# Patient Record
Sex: FEMALE | Race: WHITE | NOT HISPANIC OR LATINO | Employment: UNEMPLOYED
[De-identification: names, ages, dates, MRNs, and addresses within clinical notes are randomized per-mention and may not be internally consistent; named-entity substitution may affect disease eponyms.]

---

## 2017-01-04 ENCOUNTER — RECORDS - HEALTHEAST (OUTPATIENT)
Dept: ADMINISTRATIVE | Facility: OTHER | Age: 47
End: 2017-01-04

## 2017-01-09 ENCOUNTER — AMBULATORY - HEALTHEAST (OUTPATIENT)
Dept: SURGERY | Facility: CLINIC | Age: 47
End: 2017-01-09

## 2017-01-09 DIAGNOSIS — N63.10 BREAST MASS, RIGHT: ICD-10-CM

## 2017-01-12 ENCOUNTER — OFFICE VISIT - HEALTHEAST (OUTPATIENT)
Dept: SURGERY | Facility: CLINIC | Age: 47
End: 2017-01-12

## 2017-01-12 DIAGNOSIS — Z71.1 WORRIED WELL: ICD-10-CM

## 2017-01-12 ASSESSMENT — MIFFLIN-ST. JEOR: SCORE: 1200.34

## 2017-02-15 ENCOUNTER — TRANSFERRED RECORDS (OUTPATIENT)
Dept: HEALTH INFORMATION MANAGEMENT | Facility: CLINIC | Age: 47
End: 2017-02-15

## 2017-03-30 ENCOUNTER — AMBULATORY - HEALTHEAST (OUTPATIENT)
Dept: CARDIOLOGY | Facility: CLINIC | Age: 47
End: 2017-03-30

## 2017-03-31 ENCOUNTER — AMBULATORY - HEALTHEAST (OUTPATIENT)
Dept: CARDIOLOGY | Facility: CLINIC | Age: 47
End: 2017-03-31

## 2017-04-03 ENCOUNTER — AMBULATORY - HEALTHEAST (OUTPATIENT)
Dept: CARDIOLOGY | Facility: CLINIC | Age: 47
End: 2017-04-03

## 2017-07-31 ENCOUNTER — RECORDS - HEALTHEAST (OUTPATIENT)
Dept: LAB | Facility: HOSPITAL | Age: 47
End: 2017-07-31

## 2017-07-31 LAB — HBV SURFACE AB SERPL IA-ACNC: NEGATIVE M[IU]/ML

## 2018-06-08 ENCOUNTER — TRANSFERRED RECORDS (OUTPATIENT)
Dept: HEALTH INFORMATION MANAGEMENT | Facility: CLINIC | Age: 48
End: 2018-06-08

## 2018-06-08 ENCOUNTER — RECORDS - HEALTHEAST (OUTPATIENT)
Dept: LAB | Facility: CLINIC | Age: 48
End: 2018-06-08

## 2018-06-08 LAB
25(OH)D3 SERPL-MCNC: 40.9 NG/ML (ref 30–80)
CHOLEST SERPL-MCNC: 274 MG/DL
CHOLEST SERPL-MCNC: 274 MG/DL
FASTING STATUS PATIENT QL REPORTED: ABNORMAL
HDLC SERPL-MCNC: 61 MG/DL
HDLC SERPL-MCNC: 61 MG/DL
LDLC SERPL CALC-MCNC: 197 MG/DL
LDLC SERPL CALC-MCNC: 197 MG/DL
NONHDLC SERPL-MCNC: NORMAL MG/DL
TRIGL SERPL-MCNC: 79 MG/DL
TRIGL SERPL-MCNC: 79 MG/DL
TSH SERPL DL<=0.005 MIU/L-ACNC: 10.75 UIU/ML (ref 0.3–5)
TSH SERPL-ACNC: 10.75 MCU/ML (ref 0.3–5)

## 2018-06-25 ENCOUNTER — TELEPHONE (OUTPATIENT)
Dept: CARDIOLOGY | Facility: CLINIC | Age: 48
End: 2018-06-25

## 2018-06-25 NOTE — TELEPHONE ENCOUNTER
Patient stopped in requesting an appointment with Dr. Norman for feeling light headed and Blood pressure has been high and low.  Last OV with Dr. Norman was 11- Plan was to follow up on an as needed basis.  Next available is in September. Scheduling asked if a Team 3 nurse would evaluate when patient needed to be seen ? Sooner.? She has seen her PMD recently for thyroid and bloodwork and recommend she see them about recent sx of light headed and BP fluctuations.  If PMD feels patient needs to be seen by Cardiology soon will make arrangements to see a DOD or another provider.  Patient verbalizes understanding and will keep an OV in August with Dr. Norman and will cancel if not needed.

## 2018-06-28 ENCOUNTER — TRANSFERRED RECORDS (OUTPATIENT)
Dept: HEALTH INFORMATION MANAGEMENT | Facility: CLINIC | Age: 48
End: 2018-06-28

## 2018-07-20 ENCOUNTER — DOCUMENTATION ONLY (OUTPATIENT)
Dept: CARDIOLOGY | Facility: CLINIC | Age: 48
End: 2018-07-20

## 2018-08-09 ENCOUNTER — OFFICE VISIT (OUTPATIENT)
Dept: CARDIOLOGY | Facility: CLINIC | Age: 48
End: 2018-08-09
Payer: COMMERCIAL

## 2018-08-09 VITALS
SYSTOLIC BLOOD PRESSURE: 126 MMHG | DIASTOLIC BLOOD PRESSURE: 78 MMHG | HEART RATE: 78 BPM | HEIGHT: 66 IN | WEIGHT: 120.8 LBS | BODY MASS INDEX: 19.41 KG/M2

## 2018-08-09 DIAGNOSIS — R00.2 PALPITATIONS: Primary | ICD-10-CM

## 2018-08-09 PROCEDURE — 93000 ELECTROCARDIOGRAM COMPLETE: CPT | Performed by: INTERNAL MEDICINE

## 2018-08-09 PROCEDURE — 99214 OFFICE O/P EST MOD 30 MIN: CPT | Performed by: INTERNAL MEDICINE

## 2018-08-09 RX ORDER — ALPRAZOLAM 1 MG
1 TABLET ORAL 3 TIMES DAILY PRN
COMMUNITY

## 2018-08-09 NOTE — LETTER
8/9/2018      Connor Valentine  [facility] [address]  [facility] [address]      RE: Carla Bryan       Dear Colleague,    I had the pleasure of seeing Carla Bryan in the [facility].    Service Date: 08/09/2018      REASON FOR CLINIC VISIT:  Palpitation, fluctuating blood pressure.      HISTORY OF PRESENT ILLNESS:  Ms. Bryan (previously Ms. Islas***) is a very pleasant 47-year-old female who I initially saw in 06/2015 when she was admitted at [facility] because of chest discomfort in the setting of uncontrolled pain due to fibromyalgia.  She had mild troponin elevation, and echocardiogram showed moderate to severely reduced LV systolic function with LVEF of 30%-35% with regional wall motion abnormalities mainly in one of the midsegments, with the apex and very base chandrika okay.  This was suspicious for stress cardiomyopathy.  She underwent coronary angiogram that showed normal coronary arteries.  She was started on low-dose beta blocker, ACE inhibitor, and subsequent echocardiogram showed normalization LV function and resolution of wall motion abnormalities.  Subsequently, these medications were discontinued due to low blood pressure.  She has other comorbidities of family history of premature coronary artery disease with father having bypass surgery in mid 50s and personal history of tobacco abuse.  The patient was last time seen in 11/2016.  She was doing very well from a cardiac standpoint of view.  Today, the patient is coming because of concerns about palpitation and fluctuating blood pressure.  The patient tells me that she has noticed that her blood pressure has been fluctuating.  At times it can go up to systolic 140s-150s, at times close to 100 systolic.  She has also noticed palpitation which she describes as fluttering sensation.  This can happen several times a week.  The longest she has gone without palpitation symptoms  are about a week.  This week has been very stressful for her as there were 2 deaths in the family, a friend and father of her kid passed away.  But her symptoms started before this week for last several weeks.  She has also noticed some fatigue.  Remarkably, she is fairly active.  She walks quite a bit.  She does biking and she does rollerskating.  She has slightly decreased the intensity of these physical activities but has not noticed any particular symptoms with exertion.  No presyncope or syncope.  Unfortunately, she continues to smoke 3-5 cigarettes per day.      PHYSICAL EXAMINATION:   VITAL SIGNS:  Blood pressure 126/78, heart rate 78 regular, weight 120 pounds, BMI 19.54.   GENERAL:  The patient appears pleasant, comfortable.   NECK:  Normal JVP, no bruit.   CARDIOVASCULAR SYSTEM:  S1, S2 normal, no murmur, rub or gallop.   RESPIRATORY SYSTEM:  Clear to auscultation bilaterally.   GASTROINTESTINAL SYSTEM:  Abdomen soft, nontender.   EXTREMITIES:  No pitting pedal edema.   NEUROLOGICAL:  Alert, oriented x3.   PSYCHIATRIC:  Normal affect.   SKIN:  No obvious rash.   HEENT:  No pallor or icterus.      EKG was personally reviewed by me and shows sinus rhythm with normal intervals.      IMPRESSION AND PLAN:  A pleasant 47-year-old female with history of stress cardiomyopathy that happened in the setting of uncontrolled fibromyalgia-related pain, with normal coronary arteries on coronary angiogram, with subsequent normalization of LV function.  She is now having symptoms of palpitation, fatigue and some fluctuating blood pressure.  Regarding the blood pressure, I think overall blood pressure seems to be in the normal range.  The blood pressure was normal in the clinic.  I had a long discussion with the patient that blood pressure is supposed to be dynamic in nature.  Regarding palpitations, these sound like fluttering sensation, may be skipped beat sensation or maybe a short run of arrhythmia.  I discussed option  of doing event monitor.  We initially discussed doing an event monitor for 2 weeks, but in order not to miss episode of sustained arrhythmia, we decided to do for 3 weeks' time.  I also recommend as an abundance of precaution echocardiogram to make sure there is no structural heart abnormality.  I strongly recommend to completely quit tobacco.  I am setting up a followup in about a month, and we will go over the results of the echocardiogram and the event monitor.         THAI LAYNE MD             D: 2018   T: 2018   MT: BRYANT      Name:     LEONARDO QUINONEZ   MRN:      [id number]        Account:      [id number]   :      1970           Service Date: 2018      Document: [id number]         Outpatient Encounter Prescriptions as of 2018   Medication Sig Dispense Refill     ALPRAZolam (XANAX) 1 MG tablet Take 1 mg by mouth 3 times daily as needed for anxiety       HYDROcodone-acetaminophen (NORCO) 5-325 MG per tablet Take 1 tablet by mouth every 6 hours as needed for moderate to severe pain       levothyroxine (SYNTHROID, LEVOTHROID) 75 MCG tablet Take 88 mcg by mouth daily        multivitamin, therapeutic (THERA-VIT) TABS Take 1 tablet by mouth daily       vitamin D3 (CHOLECALCIFEROL) 71812 UNITS capsule Take 50,000 Units by mouth daily Twice a week       albuterol (PROAIR HFA, PROVENTIL HFA, VENTOLIN HFA) 108 (90 BASE) MCG/ACT inhaler Inhale 1-2 puffs into the lungs every 6 hours as needed for shortness of breath / dyspnea or wheezing       Blood Pressure Monitor KIT 1 kit daily 1 kit 0     MAGNESIUM PO Take 1 tablet by mouth daily       omeprazole (PRILOSEC) 20 MG capsule Take 1 capsule (20 mg) by mouth daily (Patient not taking: Reported on 2018) 90 capsule 3     [DISCONTINUED] lisinopril (PRINIVIL,ZESTRIL) 5 MG tablet Take 2.5 mg by mouth daily       [DISCONTINUED] metoprolol (TOPROL-XL) 25 MG 24 hr tablet Take 0.5 tablets (12.5 mg) by mouth daily (Patient not taking: Reported on  8/9/2018) 30 tablet 2     No facility-administered encounter medications on file as of 8/9/2018.        Again, thank you for allowing me to participate in the care of your patient.      Sincerely,    Gerald Norman MD     [facility]

## 2018-08-09 NOTE — MR AVS SNAPSHOT
"              After Visit Summary   8/9/2018    Carla Bryan    MRN: [id number]           Patient Information     Date Of Birth          1970        Visit Information        Provider Department      8/9/2018 11:15 AM Gerald Norman MD [facility]        Today's Diagnoses     Palpitations    -  1       Follow-ups after your visit        Additional Services     Follow-Up with Cardiologist                 Future tests that were ordered for you today     Open Future Orders        Priority Expected Expires Ordered    Follow-Up with Cardiologist Routine 9/8/2018 8/9/2019 8/9/2018    Echocardiogram Routine 8/16/2018 8/9/2019 8/9/2018    Cardiac Event Monitor - Peds/Adult Routine 8/9/2018 8/9/2019 8/9/2018            Who to contact     If you have questions or need follow up information about today's clinic visit or your schedule please contact [facility] directly at [phone].  Normal or non-critical lab and imaging results will be communicated to you by for; to (do)hart, letter or phone within 4 business days after the clinic has received the results. If you do not hear from us within 7 days, please contact the clinic through for; to (do)hart or phone. If you have a critical or abnormal lab result, we will notify you by phone as soon as possible.  Submit refill requests through [employer] or call your pharmacy and they will forward the refill request to us. Please allow 3 business days for your refill to be completed.          Additional Information About Your Visit        for; to (do)hart Information     Penana lets you send messages to your doctor, view your test results, renew your prescriptions, schedule appointments and more. To sign up, go to [email or web address] . Click on \"Log in\" on the left side of the screen, which will take you to the Welcome page. Then click on \"Sign up Now\" on the right side of the page.     You will be asked to enter the access " "code listed below, as well as some personal information. Please follow the directions to create your username and password.     Your access code is: [email or web address]  Expires: 2018 12:01 PM     Your access code will  in 90 days. If you need help or a new code, please call your [place] clinic or [phone].        Care EveryWhere ID     This is your Care EveryWhere ID. This could be used by other organizations to access your [place] medical records  [id number]        Your Vitals Were     Pulse Height BMI (Body Mass Index)             78 1.676 m (5' 6\") 19.5 kg/m2          Blood Pressure from Last 3 Encounters:   18 126/78   16 145/80   16 120/78    Weight from Last 3 Encounters:   18 54.8 kg (120 lb 12.8 oz)   16 63.5 kg (140 lb)   16 64 kg (141 lb)              We Performed the Following     EKG 12-lead complete w/read - Clinics          Today's Medication Changes          These changes are accurate as of 18 12:02 PM.  If you have any questions, ask your nurse or doctor.               Stop taking these medicines if you haven't already. Please contact your care team if you have questions.     lisinopril 5 MG tablet   Commonly known as:  PRINIVIL/ZESTRIL   Stopped by:  Gerald Norman MD           metoprolol succinate 25 MG 24 hr tablet   Commonly known as:  TOPROL-XL   Stopped by:  Gerald Norman MD                    Primary Care Provider Office Phone # Fax #    Connor Valentine [phone]       [facility] [address] [facility] [address]        Equal Access to Services     [facility]: Hadjerome Ibanez, waaidada luqadaha, qaybta kaalmada malika, cara arzola. So [facility] [phone].    ATENCIÓN: Si habla español, tiene a talley disposición servicios gratuitos de asistencia lingüística. Llame al [phone].    We comply with applicable federal civil rights laws and [place] laws. We " do not discriminate on the basis of race, color, national origin, age, disability, sex, sexual orientation, or gender identity.            Thank you!     Thank you for choosing [facility]  for your care. Our goal is always to provide you with excellent care. Hearing back from our patients is one way we can continue to improve our services. Please take a few minutes to complete the written survey that you may receive in the mail after your visit with us. Thank you!             Your Updated Medication List - Protect others around you: Learn how to safely use, store and throw away your medicines at www.disposemymeds.org.          This list is accurate as of 8/9/18 12:02 PM.  Always use your most recent med list.                   Brand Name Dispense Instructions for use Diagnosis    albuterol 108 (90 Base) MCG/ACT Inhaler    PROAIR HFA/PROVENTIL HFA/VENTOLIN HFA     Inhale 1-2 puffs into the lungs every 6 hours as needed for shortness of breath / dyspnea or wheezing        ALPRAZolam 1 MG tablet    XANAX     Take 1 mg by mouth 3 times daily as needed for anxiety        Blood Pressure Monitor Kit     1 kit    1 kit daily    Secondary cardiomyopathy (H), NSTEMI (non-ST elevated myocardial infarction) (H)       HYDROcodone-acetaminophen 5-325 MG per tablet    NORCO     Take 1 tablet by mouth every 6 hours as needed for moderate to severe pain        levothyroxine 75 MCG tablet    SYNTHROID/LEVOTHROID     Take 88 mcg by mouth daily        MAGNESIUM PO      Take 1 tablet by mouth daily        multivitamin, therapeutic Tabs tablet      Take 1 tablet by mouth daily        omeprazole 20 MG CR capsule    priLOSEC    90 capsule    Take 1 capsule (20 mg) by mouth daily    NSTEMI (non-ST elevated myocardial infarction) (H), Secondary cardiomyopathy (H)       vitamin D3 42647 UNITS capsule    CHOLECALCIFEROL     Take 50,000 Units by mouth daily Twice a week

## 2018-08-09 NOTE — LETTER
8/9/2018    Connor Valentine  [facility] [address]  [facility] [address]    RE: Carla Bryan       Dear Colleague,    I had the pleasure of seeing Carla Bryan in the [facility].    HPI and Plan:   See dictation (#[id number])    Orders Placed This Encounter   Procedures     Follow-Up with Cardiologist     Cardiac Event Monitor - Peds/Adult     EKG 12-lead complete w/read - Clinics     Echocardiogram       Orders Placed This Encounter   Medications     ALPRAZolam (XANAX) 1 MG tablet     Sig: Take 1 mg by mouth 3 times daily as needed for anxiety       Medications Discontinued During This Encounter   Medication Reason     lisinopril (PRINIVIL,ZESTRIL) 5 MG tablet Stop at Discharge     metoprolol (TOPROL-XL) 25 MG 24 hr tablet Stop at Discharge         Encounter Diagnosis   Name Primary?     Palpitations Yes       CURRENT MEDICATIONS:  Current Outpatient Prescriptions   Medication Sig Dispense Refill     ALPRAZolam (XANAX) 1 MG tablet Take 1 mg by mouth 3 times daily as needed for anxiety       HYDROcodone-acetaminophen (NORCO) 5-325 MG per tablet Take 1 tablet by mouth every 6 hours as needed for moderate to severe pain       levothyroxine (SYNTHROID, LEVOTHROID) 75 MCG tablet Take 88 mcg by mouth daily        multivitamin, therapeutic (THERA-VIT) TABS Take 1 tablet by mouth daily       vitamin D3 (CHOLECALCIFEROL) 20574 UNITS capsule Take 50,000 Units by mouth daily Twice a week       albuterol (PROAIR HFA, PROVENTIL HFA, VENTOLIN HFA) 108 (90 BASE) MCG/ACT inhaler Inhale 1-2 puffs into the lungs every 6 hours as needed for shortness of breath / dyspnea or wheezing       Blood Pressure Monitor KIT 1 kit daily 1 kit 0     MAGNESIUM PO Take 1 tablet by mouth daily       omeprazole (PRILOSEC) 20 MG capsule Take 1 capsule (20 mg) by mouth daily (Patient not taking: Reported on 8/9/2018) 90 capsule 3       ALLERGIES     Allergies   Allergen Reactions     Gabapentin       Pneumovax [Pneumococcal Polysaccharides] Rash       PAST MEDICAL HISTORY:  Past Medical History:   Diagnosis Date     Arrhythmia     hx of tachycardia     Endometriosis      Fibromyalgia      Myocardial infarction 6/13/15    NSTEMI     Palpitations      Pelvic pain      Stress-induced cardiomyopathy      Tachycardia        PAST SURGICAL HISTORY:  Past Surgical History:   Procedure Laterality Date     BREAST SURGERY      breast augmentation     CORONARY ANGIOGRAPHY ADULT ORDER  6/13/15    Angiographically normal coronary arteries. LVEF 35%, a portion of the apex contracts, also the basal 25% or less of the ventricle contracts. The remainder of ventrical circumflex circumferentially is akentic.     GYN SURGERY      laparoscopy     GYN SURGERY      tubal ligation     LAPAROSCOPIC LYSIS ADHESIONS  6/25/2013    Procedure: LAPAROSCOPIC LYSIS ADHESIONS;  LAPAROSCOPY, LYSIS OF ADHESIONS;  Surgeon: Saleem Viera MD;  Location: [facility]     ORTHOPEDIC SURGERY      foot surgery       FAMILY HISTORY:  Family History   Problem Relation Age of Onset     Heart Surgery Father      bypass x3     Myocardial Infarction Father      Pacemaker Father      Other - See Comments Mother      mitral valve Prolapse       SOCIAL HISTORY:  Social History     Social History     Marital status:      Spouse name: N/A     Number of children: N/A     Years of education: N/A     Social History Main Topics     Smoking status: Current Every Day Smoker     Packs/day: 0.25     Types: Cigarettes     Smokeless tobacco: Never Used      Comment: varies/approx 3 a day     Alcohol use No     Drug use: No     Sexual activity: Yes     Partners: Male     Other Topics Concern     Caffeine Concern No     coffee: 1 1/2 cups a day     Sleep Concern Yes     having a hard time sleeping with health     Stress Concern No     Weight Concern No     Special Diet No     Exercise Yes     cardiac rehad, walking more     Seat Belt Yes     Social  "History Narrative       Review of Systems:  Skin:  Negative       Eyes:  Positive for glasses;visual blurring    ENT:  Positive for tinnitus \"Lump in throat\"; ringing when BP is low  Respiratory:  Positive for dyspnea on exertion;shortness of breath comes and goes, length varies    Cardiovascular:    Positive for;chest pain;lightheadedness;dizziness;palpitations;heaviness;edema;fatigue    Gastroenterology: Positive for nausea    Genitourinary:  Negative      Musculoskeletal:  Positive for joint pain fibromyalgia  Neurologic:  Positive for headaches;numbness or tingling of hands;numbness or tingling of feet    Psychiatric:  Positive for anxiety    Heme/Lymph/Imm:  Negative      Endocrine:  Positive for thyroid disorder      Physical Exam:  Vitals: /78  Pulse 78  Ht 1.676 m (5' 6\")  Wt 54.8 kg (120 lb 12.8 oz)  BMI 19.5 kg/m2    Constitutional:           Skin:             Head:           Eyes:           Lymph:      ENT:           Neck:           Respiratory:            Cardiac:                                                           GI:           Extremities and Muscular Skeletal:                 Neurological:           Psych:             CC  No referring provider defined for this encounter.                    Thank you for allowing me to participate in the care of your patient.      Sincerely,     Gerald Norman MD     [facility]    cc:   No referring provider defined for this encounter.        "

## 2018-08-09 NOTE — PROGRESS NOTES
Service Date: 08/09/2018      REASON FOR CLINIC VISIT:  Palpitation, fluctuating blood pressure.      HISTORY OF PRESENT ILLNESS:  Ms. Bryan (previously Ms. Islas) is a very pleasant 47-year-old female who I initially saw in 06/2015 when she was admitted at [facility] because of chest discomfort in the setting of uncontrolled pain due to fibromyalgia.  She had mild troponin elevation, and echocardiogram showed moderate to severely reduced LV systolic function with LVEF of 30%-35% with regional wall motion abnormalities mainly in one of the midsegments, with the apex and very base chandrika okay.  This was suspicious for stress cardiomyopathy.  She underwent coronary angiogram that showed normal coronary arteries.  She was started on low-dose beta blocker, ACE inhibitor, and subsequent echocardiogram showed normalization LV function and resolution of wall motion abnormalities.  Subsequently, these medications were discontinued due to low blood pressure.  She has other comorbidities of family history of premature coronary artery disease with father having bypass surgery in mid 50s and personal history of tobacco abuse.  The patient was last time seen in 11/2016.  She was doing very well from a cardiac standpoint of view.  Today, the patient is coming because of concerns about palpitation and fluctuating blood pressure.  The patient tells me that she has noticed that her blood pressure has been fluctuating.  At times it can go up to systolic 140s-150s, at times close to 100 systolic.  She has also noticed palpitation which she describes as fluttering sensation.  This can happen several times a week.  The longest she has gone without palpitation symptoms are about a week.  This week has been very stressful for her as there were 2 deaths in the family, a friend and father of her kid passed away.  But her symptoms started before this week for last several weeks.  She has also noticed some fatigue.   Remarkably, she is fairly active.  She walks quite a bit.  She does biking and she does rollerskating.  She has slightly decreased the intensity of these physical activities but has not noticed any particular symptoms with exertion.  No presyncope or syncope.  Unfortunately, she continues to smoke 3-5 cigarettes per day.      PHYSICAL EXAMINATION:   VITAL SIGNS:  Blood pressure 126/78, heart rate 78 regular, weight 120 pounds, BMI 19.54.   GENERAL:  The patient appears pleasant, comfortable.   NECK:  Normal JVP, no bruit.   CARDIOVASCULAR SYSTEM:  S1, S2 normal, no murmur, rub or gallop.   RESPIRATORY SYSTEM:  Clear to auscultation bilaterally.   GASTROINTESTINAL SYSTEM:  Abdomen soft, nontender.   EXTREMITIES:  No pitting pedal edema.   NEUROLOGICAL:  Alert, oriented x3.   PSYCHIATRIC:  Normal affect.   SKIN:  No obvious rash.   HEENT:  No pallor or icterus.      EKG was personally reviewed by me and shows sinus rhythm with normal intervals.      IMPRESSION AND PLAN:  A pleasant 47-year-old female with history of stress cardiomyopathy that happened in the setting of uncontrolled fibromyalgia-related pain, with normal coronary arteries on coronary angiogram, with subsequent normalization of LV function.  She is now having symptoms of palpitation, fatigue and some fluctuating blood pressure.  Regarding the blood pressure, I think overall blood pressure seems to be in the normal range.  The blood pressure was normal in the clinic.  I had a long discussion with the patient that blood pressure is supposed to be dynamic in nature.  Regarding palpitations, these sound like fluttering sensation, may be skipped beat sensation or maybe a short run of arrhythmia.  I discussed option of doing event monitor.  We initially discussed doing an event monitor for 2 weeks, but in order not to miss episode of sustained arrhythmia, we decided to do for 3 weeks' time.  I also recommend as an abundance of precaution echocardiogram to  make sure there is no structural heart abnormality.  I strongly recommend to completely quit tobacco.  I am setting up a followup in about a month, and we will go over the results of the echocardiogram and the event monitor.         THAI LAYNE MD             D: 2018   T: 2018   MT: BRYANT      Name:     LEONARDO QUINONEZ   MRN:      [id number]        Account:      [id number]   :      1970           Service Date: 2018      Document: [id number]

## 2018-08-09 NOTE — LETTER
8/9/2018      Connor Valentine  [facility] [address]  [facility] [place] [address]      RE: Carla Bryan       Dear Colleague,    I had the pleasure of seeing Carla Bryan in the [facility].    Service Date: 08/09/2018      REASON FOR CLINIC VISIT:  Palpitation, fluctuating blood pressure.      HISTORY OF PRESENT ILLNESS:  Ms. Bryan (previously Ms. Islas) is a very pleasant 47-year-old female who I initially saw in 06/2015 when she was admitted at [facility] because of chest discomfort in the setting of uncontrolled pain due to fibromyalgia.  She had mild troponin elevation, and echocardiogram showed moderate to severely reduced LV systolic function with LVEF of 30%-35% with regional wall motion abnormalities mainly in one of the midsegments, with the apex and very base chandrika okay.  This was suspicious for stress cardiomyopathy.  She underwent coronary angiogram that showed normal coronary arteries.  She was started on low-dose beta blocker, ACE inhibitor, and subsequent echocardiogram showed normalization LV function and resolution of wall motion abnormalities.  Subsequently, these medications were discontinued due to low blood pressure.  She has other comorbidities of family history of premature coronary artery disease with father having bypass surgery in mid 50s and personal history of tobacco abuse.  The patient was last time seen in 11/2016.  She was doing very well from a cardiac standpoint of view.  Today, the patient is coming because of concerns about palpitation and fluctuating blood pressure.  The patient tells me that she has noticed that her blood pressure has been fluctuating.  At times it can go up to systolic 140s-150s, at times close to 100 systolic.  She has also noticed palpitation which she describes as fluttering sensation.  This can happen several times a week.  The longest she has gone without palpitation symptoms are  about a week.  This week has been very stressful for her as there were 2 deaths in the family, a friend and father of her kid passed away.  But her symptoms started before this week for last several weeks.  She has also noticed some fatigue.  Remarkably, she is fairly active.  She walks quite a bit.  She does biking and she does rollerskating.  She has slightly decreased the intensity of these physical activities but has not noticed any particular symptoms with exertion.  No presyncope or syncope.  Unfortunately, she continues to smoke 3-5 cigarettes per day.      PHYSICAL EXAMINATION:   VITAL SIGNS:  Blood pressure 126/78, heart rate 78 regular, weight 120 pounds, BMI 19.54.   GENERAL:  The patient appears pleasant, comfortable.   NECK:  Normal JVP, no bruit.   CARDIOVASCULAR SYSTEM:  S1, S2 normal, no murmur, rub or gallop.   RESPIRATORY SYSTEM:  Clear to auscultation bilaterally.   GASTROINTESTINAL SYSTEM:  Abdomen soft, nontender.   EXTREMITIES:  No pitting pedal edema.   NEUROLOGICAL:  Alert, oriented x3.   PSYCHIATRIC:  Normal affect.   SKIN:  No obvious rash.   HEENT:  No pallor or icterus.      EKG was personally reviewed by me and shows sinus rhythm with normal intervals.      IMPRESSION AND PLAN:  A pleasant 47-year-old female with history of stress cardiomyopathy that happened in the setting of uncontrolled fibromyalgia-related pain, with normal coronary arteries on coronary angiogram, with subsequent normalization of LV function.  She is now having symptoms of palpitation, fatigue and some fluctuating blood pressure.  Regarding the blood pressure, I think overall blood pressure seems to be in the normal range.  The blood pressure was normal in the clinic.  I had a long discussion with the patient that blood pressure is supposed to be dynamic in nature.  Regarding palpitations, these sound like fluttering sensation, may be skipped beat sensation or maybe a short run of arrhythmia.  I discussed option of  doing event monitor.  We initially discussed doing an event monitor for 2 weeks, but in order not to miss episode of sustained arrhythmia, we decided to do for 3 weeks' time.  I also recommend as an abundance of precaution echocardiogram to make sure there is no structural heart abnormality.  I strongly recommend to completely quit tobacco.  I am setting up a followup in about a month, and we will go over the results of the echocardiogram and the event monitor.         THAI LAYNE MD             D: 2018   T: 2018   MT: BRYANT      Name:     LEONARDO QUINONEZ   MRN:      [id number]        Account:      [id number]   :      1970           Service Date: 2018      Document: [id number]           Outpatient Encounter Prescriptions as of 2018   Medication Sig Dispense Refill     ALPRAZolam (XANAX) 1 MG tablet Take 1 mg by mouth 3 times daily as needed for anxiety       HYDROcodone-acetaminophen (NORCO) 5-325 MG per tablet Take 1 tablet by mouth every 6 hours as needed for moderate to severe pain       levothyroxine (SYNTHROID, LEVOTHROID) 75 MCG tablet Take 88 mcg by mouth daily        multivitamin, therapeutic (THERA-VIT) TABS Take 1 tablet by mouth daily       vitamin D3 (CHOLECALCIFEROL) 71614 UNITS capsule Take 50,000 Units by mouth daily Twice a week       albuterol (PROAIR HFA, PROVENTIL HFA, VENTOLIN HFA) 108 (90 BASE) MCG/ACT inhaler Inhale 1-2 puffs into the lungs every 6 hours as needed for shortness of breath / dyspnea or wheezing       Blood Pressure Monitor KIT 1 kit daily 1 kit 0     MAGNESIUM PO Take 1 tablet by mouth daily       omeprazole (PRILOSEC) 20 MG capsule Take 1 capsule (20 mg) by mouth daily (Patient not taking: Reported on 2018) 90 capsule 3     [DISCONTINUED] lisinopril (PRINIVIL,ZESTRIL) 5 MG tablet Take 2.5 mg by mouth daily       [DISCONTINUED] metoprolol (TOPROL-XL) 25 MG 24 hr tablet Take 0.5 tablets (12.5 mg) by mouth daily (Patient not taking: Reported on  8/9/2018) 30 tablet 2     No facility-administered encounter medications on file as of 8/9/2018.        Again, thank you for allowing me to participate in the care of your patient.      Sincerely,    Gerald Norman MD     [facility]

## 2018-08-09 NOTE — PROGRESS NOTES
HPI and Plan:   See dictation (#[id number])    Orders Placed This Encounter   Procedures     Follow-Up with Cardiologist     Cardiac Event Monitor - Peds/Adult     EKG 12-lead complete w/read - Clinics     Echocardiogram       Orders Placed This Encounter   Medications     ALPRAZolam (XANAX) 1 MG tablet     Sig: Take 1 mg by mouth 3 times daily as needed for anxiety       Medications Discontinued During This Encounter   Medication Reason     lisinopril (PRINIVIL,ZESTRIL) 5 MG tablet Stop at Discharge     metoprolol (TOPROL-XL) 25 MG 24 hr tablet Stop at Discharge         Encounter Diagnosis   Name Primary?     Palpitations Yes       CURRENT MEDICATIONS:  Current Outpatient Prescriptions   Medication Sig Dispense Refill     ALPRAZolam (XANAX) 1 MG tablet Take 1 mg by mouth 3 times daily as needed for anxiety       HYDROcodone-acetaminophen (NORCO) 5-325 MG per tablet Take 1 tablet by mouth every 6 hours as needed for moderate to severe pain       levothyroxine (SYNTHROID, LEVOTHROID) 75 MCG tablet Take 88 mcg by mouth daily        multivitamin, therapeutic (THERA-VIT) TABS Take 1 tablet by mouth daily       vitamin D3 (CHOLECALCIFEROL) 62642 UNITS capsule Take 50,000 Units by mouth daily Twice a week       albuterol (PROAIR HFA, PROVENTIL HFA, VENTOLIN HFA) 108 (90 BASE) MCG/ACT inhaler Inhale 1-2 puffs into the lungs every 6 hours as needed for shortness of breath / dyspnea or wheezing       Blood Pressure Monitor KIT 1 kit daily 1 kit 0     MAGNESIUM PO Take 1 tablet by mouth daily       omeprazole (PRILOSEC) 20 MG capsule Take 1 capsule (20 mg) by mouth daily (Patient not taking: Reported on 8/9/2018) 90 capsule 3       ALLERGIES     Allergies   Allergen Reactions     Gabapentin      Pneumovax [Pneumococcal Polysaccharides] Rash       PAST MEDICAL HISTORY:  Past Medical History:   Diagnosis Date     Arrhythmia     hx of tachycardia     Endometriosis      Fibromyalgia      Myocardial infarction 6/13/15    NSTEMI      "Palpitations      Pelvic pain      Stress-induced cardiomyopathy      Tachycardia        PAST SURGICAL HISTORY:  Past Surgical History:   Procedure Laterality Date     BREAST SURGERY      breast augmentation     CORONARY ANGIOGRAPHY ADULT ORDER  6/13/15    Angiographically normal coronary arteries. LVEF 35%, a portion of the apex contracts, also the basal 25% or less of the ventricle contracts. The remainder of ventrical circumflex circumferentially is akentic.     GYN SURGERY      laparoscopy     GYN SURGERY      tubal ligation     LAPAROSCOPIC LYSIS ADHESIONS  6/25/2013    Procedure: LAPAROSCOPIC LYSIS ADHESIONS;  LAPAROSCOPY, LYSIS OF ADHESIONS;  Surgeon: Saleem Viera MD;  Location: [facility]     ORTHOPEDIC SURGERY      foot surgery       FAMILY HISTORY:  Family History   Problem Relation Age of Onset     Heart Surgery Father      bypass x3     Myocardial Infarction Father      Pacemaker Father      Other - See Comments Mother      mitral valve Prolapse       SOCIAL HISTORY:  Social History     Social History     Marital status:      Spouse name: N/A     Number of children: N/A     Years of education: N/A     Social History Main Topics     Smoking status: Current Every Day Smoker     Packs/day: 0.25     Types: Cigarettes     Smokeless tobacco: Never Used      Comment: varies/approx 3 a day     Alcohol use No     Drug use: No     Sexual activity: Yes     Partners: Male     Other Topics Concern     Caffeine Concern No     coffee: 1 1/2 cups a day     Sleep Concern Yes     having a hard time sleeping with health     Stress Concern No     Weight Concern No     Special Diet No     Exercise Yes     cardiac rehad, walking more     Seat Belt Yes     Social History Narrative       Review of Systems:  Skin:  Negative       Eyes:  Positive for glasses;visual blurring    ENT:  Positive for tinnitus \"Lump in throat\"; ringing when BP is low  Respiratory:  Positive for dyspnea on exertion;shortness of " "breath comes and goes, length varies    Cardiovascular:    Positive for;chest pain;lightheadedness;dizziness;palpitations;heaviness;edema;fatigue    Gastroenterology: Positive for nausea    Genitourinary:  Negative      Musculoskeletal:  Positive for joint pain fibromyalgia  Neurologic:  Positive for headaches;numbness or tingling of hands;numbness or tingling of feet    Psychiatric:  Positive for anxiety    Heme/Lymph/Imm:  Negative      Endocrine:  Positive for thyroid disorder      Physical Exam:  Vitals: /78  Pulse 78  Ht 1.676 m (5' 6\")  Wt 54.8 kg (120 lb 12.8 oz)  BMI 19.5 kg/m2    Constitutional:           Skin:             Head:           Eyes:           Lymph:      ENT:           Neck:           Respiratory:            Cardiac:                                                           GI:           Extremities and Muscular Skeletal:                 Neurological:           Psych:             CC  No referring provider defined for this encounter.                  "

## 2018-08-16 ENCOUNTER — HOSPITAL ENCOUNTER (OUTPATIENT)
Dept: CARDIOLOGY | Facility: CLINIC | Age: 48
End: 2018-08-16
Attending: INTERNAL MEDICINE
Payer: COMMERCIAL

## 2018-08-16 ENCOUNTER — HOSPITAL ENCOUNTER (OUTPATIENT)
Dept: CARDIOLOGY | Facility: CLINIC | Age: 48
Discharge: HOME | End: 2018-08-16
Attending: INTERNAL MEDICINE | Admitting: INTERNAL MEDICINE
Payer: COMMERCIAL

## 2018-08-16 DIAGNOSIS — R00.2 PALPITATIONS: ICD-10-CM

## 2018-08-16 PROCEDURE — 93270 REMOTE 30 DAY ECG REV/REPORT: CPT

## 2018-08-16 PROCEDURE — 93272 ECG/REVIEW INTERPRET ONLY: CPT | Performed by: INTERNAL MEDICINE

## 2018-08-16 PROCEDURE — 93306 TTE W/DOPPLER COMPLETE: CPT | Mod: 26 | Performed by: INTERNAL MEDICINE

## 2018-08-16 PROCEDURE — 93306 TTE W/DOPPLER COMPLETE: CPT

## 2018-08-20 ENCOUNTER — DOCUMENTATION ONLY (OUTPATIENT)
Dept: CARDIOLOGY | Facility: CLINIC | Age: 48
End: 2018-08-20

## 2018-08-20 NOTE — PROGRESS NOTES
3 week event monitor placed per Dr. Norman for evaluation of palpitations.   24 hour transmission reports received from [facility] from 8/16/18, 8/18/18, and 8/19/18.    8/16/18: 2 strips showing sinus rhythm at a rate of 62 bpm at 10:12 AM and sinus rhythm with a rate of 83 bpm at 11:03 AM.  8/18/18: 1 strip showing sinus tachycardia at a rate of 180 bpm at 15:58 PM, although strip shows significant artifact so may be inaccurate but suspect the rate is closer to 166 bpm using a caliper to measure. Automatic event with no symptoms indicated.  8/19/18: 5 strips showing sinus rhythm with a rate of 85 bpm at 19:03 PM, sinus rhythm with a rate of 93 bpm at 19:37 PM, sinus tachycardia with a rate of 120 bpm at 19:40 PM, sinus rhythm with atrial couplet and rate of 82 bpm at 20:22 PM, and sinus rhythm with a rate of 63 bpm at 20:54 PM. Symptoms with the strips from 8/19/1 included heart racing, short of breath, dizzy, and chest pain.    Will send strips to file and continue to monitor. TONE Hdez RN - 08/20/18, 3:07 PM

## 2018-08-22 NOTE — PROGRESS NOTES
8/20/18 symptomatic episode of heart racing at 15:25 with monitor strip showing sinus rhythm at 80 bpm. Will continue to monitor.

## 2018-08-23 NOTE — PROGRESS NOTES
8-22-18 17:23:39 Symptomatic Event  87bpm   Activities Resting Symptoms: Chest pain Sinus Rhythm sent to file.

## 2018-08-27 NOTE — PROGRESS NOTES
8-25-18 19:13:48 Symptomatic Event HR 86bpm Sinus Rhythm activity resting Heart racing, 19:19:578 Sinus Rhythm HR 88bpm resting heart racing and light headed.  Will continue to monitor and strips sent to file.

## 2018-08-28 NOTE — PROGRESS NOTES
8- Symptomatic Event 17:15:54 Activity light   Symptoms short of breath and heart racing. Showing Sinus Tachycardia HR 135bpm sent to file.

## 2018-08-29 NOTE — PROGRESS NOTES
Automatic event on 8- 8:38:10  showing Sinus Tachycardia  8-28-18 10:14:08 Symptomatic event Sinus Tachycardia 108bpm moderate activity and symptoms: chest pain. Continue to monitor and strips sent to file.

## 2018-09-04 NOTE — PROGRESS NOTES
Symptomatic Event 8-31-18 15:09:20 Sinus Rhythm with Aberrancy and PSVT 9 beats  bpm Activity:  Light symptoms: Skipped beat and short of breath.  9-3-18 Symptomatic Event 9-3-18 14:07:45 Sinus Tachycardia 109bpm moderate activity and chest pain.  Will continue to monitor. Strips sent to file.

## 2018-09-07 ENCOUNTER — HOSPITAL ENCOUNTER (OUTPATIENT)
Dept: CT IMAGING | Facility: CLINIC | Age: 48
Discharge: HOME | End: 2018-09-07
Attending: FAMILY MEDICINE

## 2018-09-07 ENCOUNTER — TRANSFERRED RECORDS (OUTPATIENT)
Dept: HEALTH INFORMATION MANAGEMENT | Facility: CLINIC | Age: 48
End: 2018-09-07

## 2018-09-07 ENCOUNTER — RECORDS - HEALTHEAST (OUTPATIENT)
Dept: LAB | Facility: CLINIC | Age: 48
End: 2018-09-07

## 2018-09-07 DIAGNOSIS — R10.84 GENERALIZED ABDOMINAL PAIN: ICD-10-CM

## 2018-09-07 LAB
ALBUMIN SERPL-MCNC: 4.6 G/DL (ref 3.5–5)
ALBUMIN SERPL-MCNC: 4.6 G/DL (ref 3.5–5)
ALP SERPL-CCNC: 45 U/L (ref 45–120)
ALP SERPL-CCNC: 45 U/L (ref 45–120)
ALT SERPL W P-5'-P-CCNC: 22 U/L (ref 0–45)
ALT SERPL-CCNC: 22 U/L (ref 0–45)
ANION GAP SERPL CALCULATED.3IONS-SCNC: 10 MMOL/L (ref 5–18)
ANION GAP SERPL CALCULATED.3IONS-SCNC: 10 MMOL/L (ref 5–18)
AST SERPL W P-5'-P-CCNC: 21 U/L (ref 0–40)
AST SERPL-CCNC: 21 U/L (ref 0–40)
BILIRUB SERPL-MCNC: 1.1 MG/DL (ref 0–1)
BILIRUB SERPL-MCNC: 1.1 MG/DL (ref 0–1)
BUN SERPL-MCNC: 12 MG/DL (ref 8–22)
BUN SERPL-MCNC: 12 MG/DL (ref 8–22)
C REACTIVE PROTEIN LHE: <0.1 MG/DL (ref 0–0.8)
CALCIUM SERPL-MCNC: 10.3 MG/DL (ref 8.5–10.5)
CALCIUM SERPL-MCNC: 10.3 MG/DL (ref 8.5–10.5)
CHLORIDE BLD-SCNC: 105 MMOL/L (ref 98–107)
CHLORIDE SERPLBLD-SCNC: 105 MMOL/L (ref 98–107)
CHOLEST SERPL-MCNC: 278 MG/DL
CHOLEST SERPL-MCNC: 278 MG/DL (ref ?–199)
CO2 SERPL-SCNC: 27 MMOL/L (ref 22–31)
CO2 SERPL-SCNC: 27 MMOL/L (ref 22–31)
CREAT SERPL-MCNC: 0.77 MG/DL (ref 0.6–1.1)
CREAT SERPL-MCNC: 0.77 MG/DL (ref 0.6–1.1)
ERYTHROCYTE [DISTWIDTH] IN BLOOD BY AUTOMATED COUNT: 12.3 % (ref 11–14.5)
FASTING STATUS PATIENT QL REPORTED: ABNORMAL
GFR SERPL CREATININE-BSD FRML MDRD: >60 ML/MIN/1.73M2
GFR SERPL CREATININE-BSD FRML MDRD: >60 ML/MIN/1.73M2 (ref 60–?)
GLUCOSE BLD-MCNC: 108 MG/DL (ref 70–125)
GLUCOSE SERPL-MCNC: 108 MG/DL (ref 70–125)
HCT VFR BLD AUTO: 41.7 % (ref 35–47)
HDLC SERPL-MCNC: 69 MG/DL
HDLC SERPL-MCNC: 69 MG/DL (ref 50–?)
HEMOGLOBIN: 14 G/DL (ref 11.7–15.7)
LDLC SERPL CALC-MCNC: 189 MG/DL
LDLC SERPL CALC-MCNC: 189 MG/DL (ref ?–129)
LIPASE SERPL-CCNC: 16 U/L (ref 0–52)
MCH RBC QN AUTO: 32.8 PG (ref 27–34)
MCHC RBC AUTO-ENTMCNC: 33.6 G/DL (ref 32–36)
MCV RBC AUTO: 97.7 FL (ref 80–100)
NONHDLC SERPL-MCNC: ABNORMAL MG/DL
PLATELET # BLD AUTO: 255 10^9/L (ref 140–440)
POTASSIUM BLD-SCNC: 4 MMOL/L (ref 3.5–5)
POTASSIUM SERPL-SCNC: 4 MMOL/L (ref 3.5–5)
PROT SERPL-MCNC: 7.5 G/DL (ref 6–8)
PROT SERPL-MCNC: 7.5 G/DL (ref 6–8)
RBC # BLD AUTO: 4.27 10^12/L (ref 3.8–5.4)
SODIUM SERPL-SCNC: 142 MMOL/L (ref 136–145)
SODIUM SERPL-SCNC: 142 MMOL/L (ref 136–145)
TRIGL SERPL-MCNC: 100 MG/DL
TRIGL SERPL-MCNC: 100 MG/DL (ref ?–149)
TSH SERPL DL<=0.005 MIU/L-ACNC: 1.5 UIU/ML (ref 0.3–5)
TSH SERPL-ACNC: 1.5 MCU/ML (ref 0.3–5)
WBC # BLD AUTO: 6.4 10^9/L (ref 4–11)

## 2018-09-07 NOTE — PROGRESS NOTES
3 rhythm strips from 9/5/18 received all marked as symptoms of chest pain with light activity and resting. All strips show sinus with rates of 64-80 bpm. No arrhthymias noted.

## 2018-09-08 LAB — BACTERIA SPEC CULT: NO GROWTH

## 2018-09-14 ENCOUNTER — DOCUMENTATION ONLY (OUTPATIENT)
Dept: CARDIOLOGY | Facility: CLINIC | Age: 48
End: 2018-09-14

## 2019-10-30 ENCOUNTER — RECORDS - HEALTHEAST (OUTPATIENT)
Dept: LAB | Facility: CLINIC | Age: 49
End: 2019-10-30

## 2019-10-30 LAB
CHOLEST SERPL-MCNC: 227 MG/DL
FASTING STATUS PATIENT QL REPORTED: ABNORMAL
HDLC SERPL-MCNC: 58 MG/DL
LDLC SERPL CALC-MCNC: 153 MG/DL
TRIGL SERPL-MCNC: 81 MG/DL
TSH SERPL DL<=0.005 MIU/L-ACNC: 8.21 UIU/ML (ref 0.3–5)

## 2019-10-31 LAB — 1,25(OH)2D SERPL-MCNC: 41.3 PG/ML (ref 19.9–79.3)

## 2021-03-31 ENCOUNTER — RECORDS - HEALTHEAST (OUTPATIENT)
Dept: LAB | Facility: CLINIC | Age: 51
End: 2021-03-31

## 2021-03-31 LAB
ALBUMIN SERPL-MCNC: 4.3 G/DL (ref 3.5–5)
ALP SERPL-CCNC: 51 U/L (ref 45–120)
ALT SERPL W P-5'-P-CCNC: 21 U/L (ref 0–45)
ANION GAP SERPL CALCULATED.3IONS-SCNC: 9 MMOL/L (ref 5–18)
AST SERPL W P-5'-P-CCNC: 20 U/L (ref 0–40)
BILIRUB SERPL-MCNC: 0.7 MG/DL (ref 0–1)
BUN SERPL-MCNC: 8 MG/DL (ref 8–22)
CALCIUM SERPL-MCNC: 9.4 MG/DL (ref 8.5–10.5)
CHLORIDE BLD-SCNC: 105 MMOL/L (ref 98–107)
CHOLEST SERPL-MCNC: 238 MG/DL
CO2 SERPL-SCNC: 26 MMOL/L (ref 22–31)
CREAT SERPL-MCNC: 0.81 MG/DL (ref 0.6–1.1)
FASTING STATUS PATIENT QL REPORTED: ABNORMAL
GFR SERPL CREATININE-BSD FRML MDRD: >60 ML/MIN/1.73M2
GLUCOSE BLD-MCNC: 106 MG/DL (ref 70–125)
HDLC SERPL-MCNC: 63 MG/DL
LDLC SERPL CALC-MCNC: 161 MG/DL
POTASSIUM BLD-SCNC: 4.2 MMOL/L (ref 3.5–5)
PROT SERPL-MCNC: 6.9 G/DL (ref 6–8)
SODIUM SERPL-SCNC: 140 MMOL/L (ref 136–145)
TRIGL SERPL-MCNC: 72 MG/DL
TSH SERPL DL<=0.005 MIU/L-ACNC: 10.4 UIU/ML (ref 0.3–5)

## 2021-04-01 LAB — 25(OH)D3 SERPL-MCNC: 34.6 NG/ML (ref 30–80)

## 2021-04-02 LAB — COVID-19 ANTIBODY IGG: NEGATIVE

## 2021-05-30 VITALS — BODY MASS INDEX: 20.25 KG/M2 | HEIGHT: 66 IN | WEIGHT: 126 LBS

## 2021-06-08 NOTE — PROGRESS NOTES
"This is a 46-year-old woman who I'm asked to see by Dr. Jean Carlos Sommer for evaluation of a possible right breast mass.  The patient had some lymph nodes in her breast picked up on a recent CT scan of the chest that was done to rule out a PE.  They commented on some lymph nodes in the outer aspect of the breast.  She underwent a mammogram and ultrasound were they also felt these were just benign lymph nodes and did not recommend anything further done.  The patient however is a bit worried about it so she sent to me for my opinion.  Her paternal grandmother and then several paternal aunts had breast cancer.    Past medical history:  Patient Active Problem List   Diagnosis     Arthralgias In Multiple Sites     Shoulder Impingement     Trochanteric Bursitis     Fibromyalgia     Hypothyroidism     Insomnia       Current Outpatient Prescriptions:      albuterol (PROVENTIL HFA;VENTOLIN HFA) 90 mcg/actuation inhaler, Inhale every 4 (four) hours as needed., Disp: , Rfl:      ALPRAZolam (XANAX) 1 MG tablet, Take 1 mg by mouth every 12 (twelve) hours as needed., Disp: , Rfl:      CALCIUM CARBONATE (CALCIUM 500 ORAL), Take 1 tablet by mouth daily., Disp: , Rfl:      cholecalciferol, vitamin D3, 4,000 unit cap, , Disp: , Rfl:      HYDROcodone-acetaminophen 5-325 mg per tablet, , Disp: , Rfl:      levothyroxine (SYNTHROID, LEVOTHROID) 75 MCG tablet, Take 75 mcg by mouth daily., Disp: , Rfl:      varenicline (CHANTIX) 1 mg tablet, Take 1 mg by mouth 2 (two) times a day. Take with full glass of water., Disp: , Rfl:      VITAMIN B COMPLEX & VIT C NO.4 (SUPER B COMPLEX + C ORAL), Use As Directed., Disp: , Rfl:   Allergies   Allergen Reactions     Gabapentin      Pneumococcal 23-Valent Polysaccharide Vaccine      Social history: The patient does smoke daily.    Physical exam:  Visit Vitals     /72     Pulse 87     Ht 5' 5.5\" (1.664 m)     Wt 126 lb (57.2 kg)     SpO2 98%     BMI 20.65 kg/m2     Gen.: Thin young woman in no acute " distress.  Breasts: She has bilateral implants.  I feel no palpable masses.  There is no skin changes.  Axilla: No axillary adenopathy.    Impression: Normal findings.  I reassured her that having lymph nodes in the lateral aspect of the breast is not unusual.  They appear completely benign and are not enlarged.  I do not recommend she do anything further.    Plan: Continue with yearly mammograms.  Follow up with me as needed.

## 2022-03-24 ENCOUNTER — LAB REQUISITION (OUTPATIENT)
Dept: LAB | Facility: CLINIC | Age: 52
End: 2022-03-24

## 2022-03-24 DIAGNOSIS — J02.9 ACUTE PHARYNGITIS, UNSPECIFIED: ICD-10-CM

## 2022-03-24 PROCEDURE — 87081 CULTURE SCREEN ONLY: CPT | Performed by: FAMILY MEDICINE

## 2022-03-26 LAB — BACTERIA SPEC CULT: NORMAL

## 2022-11-14 ENCOUNTER — HOSPITAL ENCOUNTER (EMERGENCY)
Facility: CLINIC | Age: 52
Discharge: HOME | End: 2022-11-14
Attending: EMERGENCY MEDICINE | Admitting: EMERGENCY MEDICINE
Payer: COMMERCIAL

## 2022-11-14 ENCOUNTER — APPOINTMENT (OUTPATIENT)
Dept: RADIOLOGY | Facility: CLINIC | Age: 52
End: 2022-11-14
Attending: EMERGENCY MEDICINE
Payer: COMMERCIAL

## 2022-11-14 ENCOUNTER — APPOINTMENT (OUTPATIENT)
Dept: CT IMAGING | Facility: CLINIC | Age: 52
End: 2022-11-14
Attending: EMERGENCY MEDICINE
Payer: COMMERCIAL

## 2022-11-14 VITALS
RESPIRATION RATE: 17 BRPM | OXYGEN SATURATION: 98 % | HEART RATE: 66 BPM | TEMPERATURE: 98.1 F | BODY MASS INDEX: 24.94 KG/M2 | SYSTOLIC BLOOD PRESSURE: 103 MMHG | DIASTOLIC BLOOD PRESSURE: 64 MMHG | WEIGHT: 149.69 LBS | HEIGHT: 65 IN

## 2022-11-14 DIAGNOSIS — R42 VERTIGO: ICD-10-CM

## 2022-11-14 DIAGNOSIS — R51.9 SINUS HEADACHE: ICD-10-CM

## 2022-11-14 DIAGNOSIS — J01.00 ACUTE NON-RECURRENT MAXILLARY SINUSITIS: ICD-10-CM

## 2022-11-14 PROBLEM — M76.899 ENTHESOPATHY OF HIP REGION: Status: ACTIVE | Noted: 2022-11-14

## 2022-11-14 PROBLEM — R50.9 FEVER: Status: ACTIVE | Noted: 2017-03-17

## 2022-11-14 PROBLEM — M25.819 OTHER AFFECTIONS OF SHOULDER REGION, NOT ELSEWHERE CLASSIFIED: Status: ACTIVE | Noted: 2022-11-14

## 2022-11-14 PROBLEM — M25.50 PAIN IN JOINT, MULTIPLE SITES: Status: ACTIVE | Noted: 2022-11-14

## 2022-11-14 PROBLEM — R10.2 PELVIC PAIN: Status: ACTIVE | Noted: 2017-03-15

## 2022-11-14 PROBLEM — R21 RASH AND NONSPECIFIC SKIN ERUPTION: Status: ACTIVE | Noted: 2017-03-17

## 2022-11-14 PROBLEM — G47.00 INSOMNIA: Status: ACTIVE | Noted: 2022-11-14

## 2022-11-14 PROBLEM — I25.10 CAD (CORONARY ARTERY DISEASE): Status: ACTIVE | Noted: 2017-03-17

## 2022-11-14 LAB
ANION GAP SERPL CALCULATED.3IONS-SCNC: 9 MMOL/L (ref 5–18)
ATRIAL RATE - MUSE: 61 BPM
ATRIAL RATE - MUSE: 76 BPM
BASOPHILS # BLD AUTO: 0.1 10E3/UL (ref 0–0.2)
BASOPHILS NFR BLD AUTO: 1 %
BUN SERPL-MCNC: 7 MG/DL (ref 8–22)
CALCIUM SERPL-MCNC: 9.5 MG/DL (ref 8.5–10.5)
CHLORIDE BLD-SCNC: 104 MMOL/L (ref 98–107)
CO2 SERPL-SCNC: 25 MMOL/L (ref 22–31)
CREAT SERPL-MCNC: 0.87 MG/DL (ref 0.6–1.1)
DIASTOLIC BLOOD PRESSURE - MUSE: NORMAL MMHG
DIASTOLIC BLOOD PRESSURE - MUSE: NORMAL MMHG
EOSINOPHIL # BLD AUTO: 0.1 10E3/UL (ref 0–0.7)
EOSINOPHIL NFR BLD AUTO: 1 %
ERYTHROCYTE [DISTWIDTH] IN BLOOD BY AUTOMATED COUNT: 12.1 % (ref 10–15)
GFR SERPL CREATININE-BSD FRML MDRD: 80 ML/MIN/1.73M2
GLUCOSE BLD-MCNC: 104 MG/DL (ref 70–125)
HCT VFR BLD AUTO: 40.6 % (ref 35–47)
HGB BLD-MCNC: 13.5 G/DL (ref 11.7–15.7)
HOLD SPECIMEN: NORMAL
IMM GRANULOCYTES # BLD: 0 10E3/UL
IMM GRANULOCYTES NFR BLD: 0 %
INTERPRETATION ECG - MUSE: NORMAL
INTERPRETATION ECG - MUSE: NORMAL
LYMPHOCYTES # BLD AUTO: 2.2 10E3/UL (ref 0.8–5.3)
LYMPHOCYTES NFR BLD AUTO: 28 %
MCH RBC QN AUTO: 31.2 PG (ref 26.5–33)
MCHC RBC AUTO-ENTMCNC: 33.3 G/DL (ref 31.5–36.5)
MCV RBC AUTO: 94 FL (ref 78–100)
MONOCYTES # BLD AUTO: 0.4 10E3/UL (ref 0–1.3)
MONOCYTES NFR BLD AUTO: 6 %
NEUTROPHILS # BLD AUTO: 5 10E3/UL (ref 1.6–8.3)
NEUTROPHILS NFR BLD AUTO: 64 %
NRBC # BLD AUTO: 0 10E3/UL
NRBC BLD AUTO-RTO: 0 /100
P AXIS - MUSE: 75 DEGREES
P AXIS - MUSE: 82 DEGREES
PLATELET # BLD AUTO: 273 10E3/UL (ref 150–450)
POTASSIUM BLD-SCNC: 4.3 MMOL/L (ref 3.5–5)
PR INTERVAL - MUSE: 148 MS
PR INTERVAL - MUSE: 166 MS
QRS DURATION - MUSE: 78 MS
QRS DURATION - MUSE: 82 MS
QT - MUSE: 368 MS
QT - MUSE: 402 MS
QTC - MUSE: 404 MS
QTC - MUSE: 414 MS
R AXIS - MUSE: 71 DEGREES
R AXIS - MUSE: 72 DEGREES
RBC # BLD AUTO: 4.33 10E6/UL (ref 3.8–5.2)
SODIUM SERPL-SCNC: 138 MMOL/L (ref 136–145)
SYSTOLIC BLOOD PRESSURE - MUSE: NORMAL MMHG
SYSTOLIC BLOOD PRESSURE - MUSE: NORMAL MMHG
T AXIS - MUSE: 64 DEGREES
T AXIS - MUSE: 65 DEGREES
TROPONIN I SERPL-MCNC: 0.01 NG/ML (ref 0–0.29)
TROPONIN I SERPL-MCNC: <0.01 NG/ML (ref 0–0.29)
VENTRICULAR RATE- MUSE: 61 BPM
VENTRICULAR RATE- MUSE: 76 BPM
WBC # BLD AUTO: 7.8 10E3/UL (ref 4–11)

## 2022-11-14 PROCEDURE — 93005 ELECTROCARDIOGRAM TRACING: CPT | Performed by: EMERGENCY MEDICINE

## 2022-11-14 PROCEDURE — 84484 ASSAY OF TROPONIN QUANT: CPT | Performed by: EMERGENCY MEDICINE

## 2022-11-14 PROCEDURE — 84484 ASSAY OF TROPONIN QUANT: CPT | Mod: 91 | Performed by: EMERGENCY MEDICINE

## 2022-11-14 PROCEDURE — 82310 ASSAY OF CALCIUM: CPT | Performed by: EMERGENCY MEDICINE

## 2022-11-14 PROCEDURE — 99285 EMERGENCY DEPT VISIT HI MDM: CPT | Mod: 25

## 2022-11-14 PROCEDURE — 250N000013 HC RX MED GY IP 250 OP 250 PS 637: Performed by: EMERGENCY MEDICINE

## 2022-11-14 PROCEDURE — 71046 X-RAY EXAM CHEST 2 VIEWS: CPT

## 2022-11-14 PROCEDURE — 70450 CT HEAD/BRAIN W/O DYE: CPT

## 2022-11-14 PROCEDURE — 85025 COMPLETE CBC W/AUTO DIFF WBC: CPT | Performed by: EMERGENCY MEDICINE

## 2022-11-14 PROCEDURE — 36415 COLL VENOUS BLD VENIPUNCTURE: CPT | Performed by: EMERGENCY MEDICINE

## 2022-11-14 RX ORDER — MECLIZINE HYDROCHLORIDE 25 MG/1
25 TABLET ORAL ONCE
Status: COMPLETED | OUTPATIENT
Start: 2022-11-14 | End: 2022-11-14

## 2022-11-14 RX ORDER — MECLIZINE HYDROCHLORIDE 25 MG/1
25 TABLET ORAL 3 TIMES DAILY PRN
Qty: 20 TABLET | Refills: 0 | Status: SHIPPED | OUTPATIENT
Start: 2022-11-14 | End: 2022-12-04

## 2022-11-14 RX ORDER — CLONIDINE HYDROCHLORIDE 0.1 MG/1
0.1 TABLET ORAL ONCE
Status: COMPLETED | OUTPATIENT
Start: 2022-11-14 | End: 2022-11-14

## 2022-11-14 RX ADMIN — CLONIDINE HYDROCHLORIDE 0.1 MG: 0.1 TABLET ORAL at 16:54

## 2022-11-14 RX ADMIN — MECLIZINE HYDROCHLORIDE 25 MG: 25 TABLET ORAL at 16:54

## 2022-11-14 ASSESSMENT — ENCOUNTER SYMPTOMS
FATIGUE: 1
DIZZINESS: 1
LIGHT-HEADEDNESS: 1
SHORTNESS OF BREATH: 1
DIAPHORESIS: 0

## 2022-11-14 ASSESSMENT — ACTIVITIES OF DAILY LIVING (ADL): ADLS_ACUITY_SCORE: 35

## 2022-11-14 NOTE — ED PROVIDER NOTES
EMERGENCY DEPARTMENT ENCOUNTER      NAME: Carla Bryan  AGE: 52 year old female  YOB: 1970  MRN: [id number]  EVALUATION DATE & TIME: 11/14/2022  4:11 PM    PCP: Wesley Henry    ED PROVIDER: José Vega M.D.       Chief Complaint   Patient presents with     Headache     Nausea     Dizziness     Chest Pain     FINAL IMPRESSION:  1. Acute non-recurrent maxillary sinusitis    2. Vertigo    3. Sinus headache      ED COURSE & MEDICAL DECISION MAKING:    Pertinent Labs & Imaging studies reviewed. (See chart for details)  52 year old female presents to the Emergency Department for evaluation of headache dizziness congestion sinus symptoms.  Patient reports that a virus had been going through the house over the past couple of weeks she feels like for the most part she is getting better although she is developed some sinus syndromes increased pressure and drainage.  This also has precipitated a headache which is unusual for her.  Today would ultimately brought her to the emergency department as she was trying to clear her nose and felt a popping sensation in her left ear that was followed by onset of dizziness.  The dizziness symptoms seem vertiginous in nature and her clinical history and examination was most consistent with a peripheral mediated vertigo.  On examination she had no neurological deficits to clinical examination she was not relating any neck pain her headache seemed related to her sinus symptoms.  Noted to have an effusion on examination of her left tympanic membrane.  There was no nystagmus no truncal ataxia no other concerning exam findings.  She had related some vague chest tightness on history as well.  Patient has a past medical history of stress-induced myocardial infarction reporting that several years ago she had an angiogram in response to this where she was told that her arteries were clear.  ECG was normal and initial troponin was negative I recommended the patient that  given her heart history we obtain a second troponin and second EKG those were negative.  There is no pleuritic component to her symptoms no leg pain or swelling.  Her pulmonary examination is normal.  Low pretest probability for thromboembolic disease.  Chest x-ray performed to evaluate for possible pneumonia this was negative.  Overall patient demonstrated clinical improvement with symptomatic management in the emergency department.  I think she is appropriate for discharge home with continued symptomatic management follow-up with her primary care doctor.  Patient was comfortable this plan of care.  I reviewed return precautions prior to discharge.       4:28 PM I met with the patient, obtained history, performed an initial exam, and discussed options and plan for diagnostics and treatment here in the ED.     At the conclusion of the encounter I discussed the results of all of the tests and the disposition. The questions were answered. The patient or family acknowledged understanding and was agreeable with the care plan.     Medical Decision Making    Supplemental history from: N/A    External Record(s) Reviewed: Inpatient Record    Differential Diagnosis: See MDM charting for differential considered.     I performed an independent interpretation of the: EKG: See my documentation.    Discussed with radiology regarding test interpretation: N/A    Discussion of management with another provider: See chart documentation, if applicable    The following testing was considered but ultimately not selected: None     I considered prescription management with: Symptomatic Management    The patient's care impacted: None    Consideration of Admission/Observation: N/A - Patient discharged without consideration for admission    Care significantly affected by Social Determinants of Health including: N/A    MEDICATIONS GIVEN IN THE EMERGENCY:  Medications   cloNIDine (CATAPRES) tablet 0.1 mg (0.1 mg Oral Given 11/14/22 2813)  "  meclizine (ANTIVERT) tablet 25 mg (25 mg Oral Given 11/14/22 1253)       NEW PRESCRIPTIONS STARTED AT TODAY'S ER VISIT  Discharge Medication List as of 11/14/2022  6:51 PM      START taking these medications    Details   meclizine (ANTIVERT) 25 MG tablet Take 1 tablet (25 mg) by mouth 3 times daily as needed for dizziness, Disp-20 tablet, R-0, Local Print              =================================================================    HPI  Patient information was obtained from: patient     Use of : N/A       Carla Bryan is a 52 year old female with a pertinent history of stress heart attack, CAD, COPD, hypothyroidism, paroxysmal supraventricular tachycardia and tobacco use disorder who presents to this ED via walk-in for evaluation of headache.    In 2015 the patient had a stress heart attack.    10 days ago the patient began to feel sick after her 2 grandkids (who live with her) tested positive for RSV. Starting 4 days ago, she started to get intermittent headaches. Yesterday, she got fatigued, her sinuses felt very congested, and while she was blowing her nose she felt her ear get \"plugged up\" and she was off balance. Her ear does not hurt and she does not hear any whooshing. Today she has had episodes of vertigo while walking, nausea, chest tightness with associated shortness of breath, and her headaches feel \"like a blood pressure headache\". Her \"vertigo\" is not room spinning dizziness or tunnel vision like lightheadedness but feels like a mild combination of the two that cause her to be unsteady like she is walking on a rocking boat. She does not take blood pressure medications and her blood pressure is 181/86 but she states that this headache feel different than usual. She very rarely gets headaches. She adds that she is under a lot of stress right now and is worried about another heart complication. However, she adds that these symptoms are different than the diaphoresis and chest pain that " she had in her last heart attack.    She denies dizziness while sitting, pain with breathing, leg pain, leg swelling, or any other complaints at this time.     REVIEW OF SYSTEMS   Review of Systems   Constitutional: Positive for fatigue. Negative for diaphoresis.   HENT: Positive for congestion. Negative for ear pain.    Respiratory: Positive for shortness of breath.         Negative for pain with breathing   Cardiovascular: Negative for leg swelling.   Musculoskeletal:        Negative for leg pain   Neurological: Positive for dizziness and light-headedness.   All other systems reviewed and are negative.       PAST MEDICAL HISTORY:  Past Medical History:   Diagnosis Date     Arrhythmia     hx of tachycardia     Endometriosis      Fibromyalgia      Myocardial infarction (H) 6/13/15    NSTEMI     Palpitations      Pelvic pain      Stress-induced cardiomyopathy      Tachycardia        PAST SURGICAL HISTORY:  Past Surgical History:   Procedure Laterality Date     BREAST SURGERY      breast augmentation     COMBINED AUGMENTATION MAMMAPLASTY AND ABDOMINOPLASTY  1996     CORONARY ANGIOGRAPHY ADULT ORDER  6/13/15    Angiographically normal coronary arteries. LVEF 35%, a portion of the apex contracts, also the basal 25% or less of the ventricle contracts. The remainder of ventrical circumflex circumferentially is akentic.     GYN SURGERY      laparoscopy     GYN SURGERY      tubal ligation     LAPAROSCOPIC LYSIS ADHESIONS  6/25/2013    Procedure: LAPAROSCOPIC LYSIS ADHESIONS;  LAPAROSCOPY, LYSIS OF ADHESIONS;  Surgeon: Saleem Viera MD;  Location: [facility]     ORTHOPEDIC SURGERY      foot surgery           CURRENT MEDICATIONS:    meclizine (ANTIVERT) 25 MG tablet  albuterol (PROAIR HFA, PROVENTIL HFA, VENTOLIN HFA) 108 (90 BASE) MCG/ACT inhaler  ALPRAZolam (XANAX) 1 MG tablet  Blood Pressure Monitor KIT  HYDROcodone-acetaminophen (NORCO) 5-325 MG per tablet  levothyroxine (SYNTHROID, LEVOTHROID) 75 MCG  "tablet  MAGNESIUM PO  multivitamin, therapeutic (THERA-VIT) TABS  omeprazole (PRILOSEC) 20 MG capsule  vitamin D3 (CHOLECALCIFEROL) 57539 UNITS capsule        ALLERGIES:  Allergies   Allergen Reactions     Gabapentin      Penicillins Rash     Pneumovax [Pneumococcal Polysaccharides] Rash       FAMILY HISTORY:  Family History   Problem Relation Age of Onset     Heart Surgery Father         bypass x3     Myocardial Infarction Father      Pacemaker Father      Other - See Comments Mother         mitral valve Prolapse     Breast Cancer Paternal Aunt      Breast Cancer Paternal Grandmother      Breast Cancer Paternal Aunt      Breast Cancer Paternal Aunt        SOCIAL HISTORY:   Social History     Socioeconomic History     Marital status:    Tobacco Use     Smoking status: Every Day     Packs/day: 0.25     Types: Cigarettes     Smokeless tobacco: Never     Tobacco comments:     varies/approx 3 a day   Substance and Sexual Activity     Alcohol use: No     Drug use: No     Sexual activity: Yes     Partners: Male   Other Topics Concern     Caffeine Concern No     Comment: coffee: 1 1/2 cups a day     Sleep Concern Yes     Comment: having a hard time sleeping with health     Stress Concern No     Weight Concern No     Special Diet No     Exercise Yes     Comment: cardiac rehad, walking more     Seat Belt Yes       VITALS:  /64   Pulse 66   Temp 98.1  F (36.7  C) (Oral)   Resp 17   Ht 1.651 m (5' 5\")   Wt 67.9 kg (149 lb 11.1 oz)   LMP 05/13/2015   SpO2 98%   BMI 24.91 kg/m      PHYSICAL EXAM    PHYSICAL EXAM    Constitutional: Well developed, Well nourished, NAD  HENT: Normocephalic, Atraumatic, Bilateral external ears normal, Oropharynx normal, mucous membranes moist, Nose normal.  Examination of the left tympanic membrane there seem to be an effusion present.  There was no erythema.  No lymphadenopathy neck-  Normal range of motion, No tenderness, Supple, No stridor.   Eyes: PERRL, EOMI, Conjunctiva " normal, No discharge.   Respiratory: Normal breath sounds, No respiratory distress, No wheezing, Speaks full sentences easily. No cough.   Cardiovascular: Normal heart rate, Regular rhythm, No murmurs Chest wall nontender.    GI:  Soft, No tenderness, No masses, No flank tenderness. No rebound or guarding.  : No cva tenderness    Musculoskeletal: 2+ DP pulses. No edema. No cyanosis. Good range of motion in all major joints. No tenderness to palpation. No tenderness of the CTLS spine.   Integument: Warm, Dry, No erythema, No rash. No petechiae.   Neurologic: Alert & oriented x 3, Normal motor function, Normal sensory function, No focal deficits noted.  There was no nystagmus appreciated on clinical examination cerebellar function appear to be intact patient was ambulatory no truncal ataxia  Psychiatric: Affect normal, Judgment normal, Mood normal. Cooperative.        LAB:  All pertinent labs reviewed and interpreted.  Results for orders placed or performed during the hospital encounter of 11/14/22   Head CT w/o contrast    Impression    IMPRESSION:  1.  No acute intracranial process.  2.  Air-fluid levels bilateral maxillary sinuses. Please correlate clinically for acute sinusitis.   Chest XR,  PA & LAT    Impression    IMPRESSION:     Normal cardiac and mediastinal contours.    Symmetric normal lung inflation. Minimal apical pleural parenchymal scarring. There are no new airspace or interstitial opacities.    Diaphragm curvature is normal. No pleural effusion.    Thoracic vertebra are maintained in height and shape. Breast implants are present.   Basic metabolic panel   Result Value Ref Range    Sodium 138 136 - 145 mmol/L    Potassium 4.3 3.5 - 5.0 mmol/L    Chloride 104 98 - 107 mmol/L    Carbon Dioxide (CO2) 25 22 - 31 mmol/L    Anion Gap 9 5 - 18 mmol/L    Urea Nitrogen 7 (L) 8 - 22 mg/dL    Creatinine 0.87 0.60 - 1.10 mg/dL    Calcium 9.5 8.5 - 10.5 mg/dL    Glucose 104 70 - 125 mg/dL    GFR Estimate 80 >60  mL/min/1.73m2   Result Value Ref Range    Troponin I 0.01 0.00 - 0.29 ng/mL   CBC with platelets and differential   Result Value Ref Range    WBC Count 7.8 4.0 - 11.0 10e3/uL    RBC Count 4.33 3.80 - 5.20 10e6/uL    Hemoglobin 13.5 11.7 - 15.7 g/dL    Hematocrit 40.6 35.0 - 47.0 %    MCV 94 78 - 100 fL    MCH 31.2 26.5 - 33.0 pg    MCHC 33.3 31.5 - 36.5 g/dL    RDW 12.1 10.0 - 15.0 %    Platelet Count 273 150 - 450 10e3/uL    % Neutrophils 64 %    % Lymphocytes 28 %    % Monocytes 6 %    % Eosinophils 1 %    % Basophils 1 %    % Immature Granulocytes 0 %    NRBCs per 100 WBC 0 <1 /100    Absolute Neutrophils 5.0 1.6 - 8.3 10e3/uL    Absolute Lymphocytes 2.2 0.8 - 5.3 10e3/uL    Absolute Monocytes 0.4 0.0 - 1.3 10e3/uL    Absolute Eosinophils 0.1 0.0 - 0.7 10e3/uL    Absolute Basophils 0.1 0.0 - 0.2 10e3/uL    Absolute Immature Granulocytes 0.0 <=0.4 10e3/uL    Absolute NRBCs 0.0 10e3/uL   Extra Blue Top Tube   Result Value Ref Range    Hold Specimen [facility]    Troponin I (now)   Result Value Ref Range    Troponin I <0.01 0.00 - 0.29 ng/mL   ECG 12-LEAD WITH MUSE ([facility])   Result Value Ref Range    Systolic Blood Pressure  mmHg    Diastolic Blood Pressure  mmHg    Ventricular Rate 76 BPM    Atrial Rate 76 BPM    AR Interval 148 ms    QRS Duration 78 ms     ms    QTc 414 ms    P Axis 75 degrees    R AXIS 71 degrees    T Axis 64 degrees    Interpretation ECG       Sinus rhythm  Cannot rule out Anterior infarct , age undetermined  Abnormal ECG  When compared with ECG of 20-DEC-2016 16:33,  No significant change was found  Confirmed by SEE ED PROVIDER NOTE FOR, ECG INTERPRETATION ([id number]),  RANDAL GUEVARA ([id number]) on 11/14/2022 2:56:56 PM     ECG 12-LEAD WITH MUSE ([facility])   Result Value Ref Range    Systolic Blood Pressure  mmHg    Diastolic Blood Pressure  mmHg    Ventricular Rate 61 BPM    Atrial Rate 61 BPM    AR Interval 166 ms    QRS Duration 82 ms     ms    QTc 404 ms    P Axis 82 degrees    R AXIS 72  degrees    T Axis 65 degrees    Interpretation ECG       Sinus rhythm  Normal ECG  When compared with ECG of 14-NOV-2022 14:01,  No significant change was found  Confirmed by SEE ED PROVIDER NOTE FOR, ECG INTERPRETATION ([id number]),  ARNDAL GUEVARA ([id number]) on 11/14/2022 5:05:00 PM         RADIOLOGY:  Reviewed all pertinent imaging. Please see official radiology report.  Chest XR,  PA & LAT   Final Result   IMPRESSION:       Normal cardiac and mediastinal contours.      Symmetric normal lung inflation. Minimal apical pleural parenchymal scarring. There are no new airspace or interstitial opacities.      Diaphragm curvature is normal. No pleural effusion.      Thoracic vertebra are maintained in height and shape. Breast implants are present.      Head CT w/o contrast   Final Result   IMPRESSION:   1.  No acute intracranial process.   2.  Air-fluid levels bilateral maxillary sinuses. Please correlate clinically for acute sinusitis.          EKG:    Performed at: November 14, 2022, 2:01 PM, [facility] EMERGENCY ROOM      Impression: Sinus rhythm, Cannot rule out anterior infarct, age undetermined.    Rate: 76 BPM  Rhythm: Sinus rhythm  Axis: 75 71 64   OR Interval: 148 ms  QRS Interval: 78 ms  QTc Interval: 368/414 ms  ST Changes: none   Comparison: Abnormal ECG when compared with 20-DEC-2016, no significant change was found        Performed at: November 14, 2022, 4:57 PM, [facility] EMERGENCY ROOM      Impression: Sinus rhythm    Rate: 61 BPM  Rhythm: Sinus rhythm  Axis: 82 72 65   OR Interval: 166 ms  QRS Interval: 82 ms   QTc Interval: 402/404 ms  ST Changes: none  Comparison: When compared with ECG of 14-NOV-2022 2:01 PM, no significant change was found    I have independently reviewed and interpreted the EKG(s) documented above.    I, Charli Connell, am serving as a scribe to document services personally performed by José Vega M.D. based on my  observation and the provider's statements to me. I, José Vega M.D., attest that Charli Connell is acting in a scribe capacity, has observed my performance of the services and has documented them in accordance with my direction.    José Vega M.D.  [facility] EMERGENCY ROOM  [address] [address]  [phone]     José Vega MD  11/14/22 0639

## 2022-11-14 NOTE — ED TRIAGE NOTES
"Today patient woke up has chest tightness, feels like she has to work to take full breath, also endorses pressure/throb \"Blood pressure\" headache. Intermittent dizziness, occurring more today. Nausea.      Triage Assessment     Row Name 11/14/22 [id number]       Triage Assessment (Adult)    Airway WDL WDL       Respiratory WDL    Respiratory WDL X  breathing feels tight       Skin Circulation/Temperature WDL    Skin Circulation/Temperature WDL WDL       Cardiac WDL    Cardiac WDL X  chest tightness       Peripheral/Neurovascular WDL    Peripheral Neurovascular WDL WDL       Cognitive/Neuro/Behavioral WDL    Cognitive/Neuro/Behavioral WDL X  headache              "

## 2022-11-15 NOTE — DISCHARGE INSTRUCTIONS
Overall your work-up in the emergency department was reassuring.  Your imaging did not demonstrate acute sinusitis which I do think is the source of your headache.  This is also triggered a peripheral mediated vertigo process.  This should be self resolving.  Recommend over-the-counter decongestants.  Tylenol and or ibuprofen for management of your headaches.  Utilize meclizine for management of your vertigo.  Please follow-up with your primary care physician later this week for recheck of your blood pressure and repeat examination follow-up to emergency department visit.  If you have escalation to your symptoms or develop additional concern return to emergency department repeat assessment.

## 2023-04-12 ENCOUNTER — HOSPITAL ENCOUNTER (EMERGENCY)
Facility: CLINIC | Age: 53
Discharge: HOME | End: 2023-04-12
Attending: FAMILY MEDICINE | Admitting: FAMILY MEDICINE
Payer: COMMERCIAL

## 2023-04-12 ENCOUNTER — APPOINTMENT (OUTPATIENT)
Dept: CT IMAGING | Facility: CLINIC | Age: 53
End: 2023-04-12
Attending: FAMILY MEDICINE
Payer: COMMERCIAL

## 2023-04-12 VITALS
WEIGHT: 142 LBS | OXYGEN SATURATION: 99 % | RESPIRATION RATE: 16 BRPM | HEART RATE: 66 BPM | TEMPERATURE: 98.1 F | DIASTOLIC BLOOD PRESSURE: 87 MMHG | SYSTOLIC BLOOD PRESSURE: 141 MMHG | BODY MASS INDEX: 23.63 KG/M2

## 2023-04-12 DIAGNOSIS — R10.9 ABDOMINAL WALL PAIN: ICD-10-CM

## 2023-04-12 LAB
ALBUMIN UR-MCNC: NEGATIVE MG/DL
APPEARANCE UR: CLEAR
BACTERIA #/AREA URNS HPF: ABNORMAL /HPF
BASOPHILS # BLD AUTO: 0.1 10E3/UL (ref 0–0.2)
BASOPHILS NFR BLD AUTO: 1 %
BILIRUB UR QL STRIP: NEGATIVE
COLOR UR AUTO: COLORLESS
EOSINOPHIL # BLD AUTO: 0.1 10E3/UL (ref 0–0.7)
EOSINOPHIL NFR BLD AUTO: 1 %
ERYTHROCYTE [DISTWIDTH] IN BLOOD BY AUTOMATED COUNT: 12 % (ref 10–15)
GLUCOSE UR STRIP-MCNC: NEGATIVE MG/DL
HCT VFR BLD AUTO: 41.5 % (ref 35–47)
HGB BLD-MCNC: 13.9 G/DL (ref 11.7–15.7)
HGB UR QL STRIP: ABNORMAL
HOLD SPECIMEN: NORMAL
IMM GRANULOCYTES # BLD: 0 10E3/UL
IMM GRANULOCYTES NFR BLD: 0 %
KETONES UR STRIP-MCNC: NEGATIVE MG/DL
LEUKOCYTE ESTERASE UR QL STRIP: NEGATIVE
LIPASE SERPL-CCNC: 26 U/L (ref 0–52)
LYMPHOCYTES # BLD AUTO: 2.6 10E3/UL (ref 0.8–5.3)
LYMPHOCYTES NFR BLD AUTO: 33 %
MCH RBC QN AUTO: 31.3 PG (ref 26.5–33)
MCHC RBC AUTO-ENTMCNC: 33.5 G/DL (ref 31.5–36.5)
MCV RBC AUTO: 94 FL (ref 78–100)
MONOCYTES # BLD AUTO: 0.5 10E3/UL (ref 0–1.3)
MONOCYTES NFR BLD AUTO: 6 %
NEUTROPHILS # BLD AUTO: 4.6 10E3/UL (ref 1.6–8.3)
NEUTROPHILS NFR BLD AUTO: 59 %
NITRATE UR QL: NEGATIVE
NRBC # BLD AUTO: 0 10E3/UL
NRBC BLD AUTO-RTO: 0 /100
PH UR STRIP: 6 [PH] (ref 5–7)
PLATELET # BLD AUTO: 296 10E3/UL (ref 150–450)
RBC # BLD AUTO: 4.44 10E6/UL (ref 3.8–5.2)
RBC URINE: 1 /HPF
SP GR UR STRIP: 1 (ref 1–1.03)
SQUAMOUS EPITHELIAL: 1 /HPF
UROBILINOGEN UR STRIP-MCNC: <2 MG/DL
WBC # BLD AUTO: 7.8 10E3/UL (ref 4–11)
WBC URINE: <1 /HPF

## 2023-04-12 PROCEDURE — 85025 COMPLETE CBC W/AUTO DIFF WBC: CPT | Performed by: FAMILY MEDICINE

## 2023-04-12 PROCEDURE — 74177 CT ABD & PELVIS W/CONTRAST: CPT

## 2023-04-12 PROCEDURE — 81003 URINALYSIS AUTO W/O SCOPE: CPT | Performed by: FAMILY MEDICINE

## 2023-04-12 PROCEDURE — 250N000011 HC RX IP 250 OP 636: Performed by: FAMILY MEDICINE

## 2023-04-12 PROCEDURE — 83690 ASSAY OF LIPASE: CPT | Performed by: FAMILY MEDICINE

## 2023-04-12 PROCEDURE — 99285 EMERGENCY DEPT VISIT HI MDM: CPT | Mod: 25

## 2023-04-12 PROCEDURE — 36415 COLL VENOUS BLD VENIPUNCTURE: CPT | Performed by: EMERGENCY MEDICINE

## 2023-04-12 RX ORDER — IOPAMIDOL 755 MG/ML
100 INJECTION, SOLUTION INTRAVASCULAR ONCE
Status: COMPLETED | OUTPATIENT
Start: 2023-04-12 | End: 2023-04-12

## 2023-04-12 RX ORDER — ERYTHROMYCIN 5 MG/G
0.5 OINTMENT OPHTHALMIC 2 TIMES DAILY
Qty: 7 G | Refills: 0 | Status: SHIPPED | OUTPATIENT
Start: 2023-04-12 | End: 2023-04-12

## 2023-04-12 RX ADMIN — IOPAMIDOL 100 ML: 755 INJECTION, SOLUTION INTRAVENOUS at 17:25

## 2023-04-12 ASSESSMENT — ENCOUNTER SYMPTOMS
ABDOMINAL PAIN: 1
VOMITING: 0
NAUSEA: 0
FEVER: 0

## 2023-04-12 NOTE — ED PROVIDER NOTES
EMERGENCY DEPARTMENT ENCOUNTER      NAME: Carla Bryan  AGE: 52 year old female  YOB: 1970  MRN: [id number]  EVALUATION DATE & TIME: No admission date for patient encounter.    PCP: Wesley Henry    ED PROVIDER: Enrique Metzger M.D.    Chief Complaint   Patient presents with     Abdominal Pain       FINAL IMPRESSION:  1. Abdominal wall pain        ED COURSE & MEDICAL DECISION MAKING:    Pertinent Labs & Imaging studies independently interpreted by me. (See chart for details)  4:15 PM Patient seen and examined, reviewed prior clinic record from earlier today, sent to the emergency department for evaluation of right-sided abdominal pain.  This been going on for 6 weeks, waxes and wanes.  When present, eating makes it worse.  She has right upper quadrant tenderness and some right lower quadrant tenderness as well.  Clinic was concerned about possible appendicitis, given time course this is unlikely.  Acute cholecystitis or ovarian pathology possible, also consider colitis or obstruction.  Intra-abdominal mass also possible.  Labs and CT scan are ordered for initial evaluation.  6:04 PM labs independently interpreted by me demonstrate normal CBC and normal lipase.  CT scan independently interpreted by me negative for acute findings, there are some liver cysts that do not need further evaluation.  Patient was reexamined now that she is in a bed.  Positive Carnett's sign.  Symptoms are most consistent with abdominal wall pain.  No findings for acute appendicitis, acute cholecystitis, pancreatitis, colitis, or intra-abdominal mass.  No ovarian edema or cyst, ovarian torsion unlikely.  Patient is stable for discharge with outpatient follow-up.  We discussed symptom management.       At the conclusion of the encounter I discussed the results of all of the tests and the disposition. The questions were answered. The patient or family acknowledged understanding and was agreeable with the care plan.      Medical Decision Making    History:    Supplemental history from: Documented in chart, if applicable    External Record(s) reviewed: Documented in chart, if applicable.    Work Up:    Chart documentation includes differential considered and any EKGs or imaging independently interpreted by provider, where specified.    In additional to work up documented, I considered the following work up: Documented in chart, if applicable.    External consultation:    Discussion of management with another provider: Documented in chart, if applicable    Complicating factors:    Care impacted by chronic illness: Chronic Lung Disease, Heart Disease and Smoking / Nicotine Use    Care affected by social determinants of health: N/A    Disposition considerations: Discharge. No recommendations on prescription strength medication(s). I considered admission, but ultimately discharged patient with reassuring emergency department evaluation, no acute abdominal catastrophe or.    MEDICATIONS GIVEN IN THE EMERGENCY:  Medications   iopamidol (ISOVUE-370) solution 100 mL (100 mLs Intravenous $Given 4/12/23 2899)       NEW PRESCRIPTIONS STARTED AT TODAY'S ER VISIT  Current Discharge Medication List          =================================================================    HPI    Patient information was obtained from: Patient      Carla Bryan is a 52 year old female with a pertinent history of MI, CAD, SVT, COPD, tobacco use, who presents to this ED via walk-in for evaluation of abdominal pain.    Patient endorses 6 weeks of intermittent abdominal pain that is worse in her RLQ. She describes the pain as pressure sensation that is worse with activity, such as when she was trimming trees, and worse after eating. Patient reports a prior hysterectomy, but still has her gall bladder and appendix. She has not taken any pain medications.     Patient states she was seen by her PCP today who ruled out hernia, but referred patient to ED for further  evaluation.    Patient denies nausea, vomiting, fever, and all other complaints at this time.      REVIEW OF SYSTEMS   Review of Systems   Constitutional: Negative for fever.   Gastrointestinal: Positive for abdominal pain. Negative for nausea and vomiting.   All other systems reviewed and are negative.     All other systems reviewed and negative    PAST MEDICAL HISTORY:  Past Medical History:   Diagnosis Date     Arrhythmia     hx of tachycardia     Endometriosis      Fibromyalgia      Myocardial infarction (H) 6/13/15    NSTEMI     Palpitations      Pelvic pain      Stress-induced cardiomyopathy      Tachycardia        PAST SURGICAL HISTORY:  Past Surgical History:   Procedure Laterality Date     BREAST SURGERY      breast augmentation     COMBINED AUGMENTATION MAMMAPLASTY AND ABDOMINOPLASTY  1996     CORONARY ANGIOGRAPHY ADULT ORDER  6/13/15    Angiographically normal coronary arteries. LVEF 35%, a portion of the apex contracts, also the basal 25% or less of the ventricle contracts. The remainder of ventrical circumflex circumferentially is akentic.     GYN SURGERY      laparoscopy     GYN SURGERY      tubal ligation     LAPAROSCOPIC LYSIS ADHESIONS  6/25/2013    Procedure: LAPAROSCOPIC LYSIS ADHESIONS;  LAPAROSCOPY, LYSIS OF ADHESIONS;  Surgeon: Saleem Viera MD;  Location: [facility]     ORTHOPEDIC SURGERY      foot surgery       CURRENT MEDICATIONS:    No current facility-administered medications for this encounter.     Current Outpatient Medications   Medication     albuterol (PROAIR HFA, PROVENTIL HFA, VENTOLIN HFA) 108 (90 BASE) MCG/ACT inhaler     ALPRAZolam (XANAX) 1 MG tablet     Blood Pressure Monitor KIT     HYDROcodone-acetaminophen (NORCO) 5-325 MG per tablet     levothyroxine (SYNTHROID, LEVOTHROID) 75 MCG tablet     MAGNESIUM PO     multivitamin, therapeutic (THERA-VIT) TABS     omeprazole (PRILOSEC) 20 MG capsule     vitamin D3 (CHOLECALCIFEROL) 63206 UNITS capsule        ALLERGIES:  Allergies   Allergen Reactions     Gabapentin      Penicillins Rash     Pneumovax [Pneumococcal Polysaccharides] Rash       FAMILY HISTORY:  Family History   Problem Relation Age of Onset     Heart Surgery Father         bypass x3     Myocardial Infarction Father      Pacemaker Father      Other - See Comments Mother         mitral valve Prolapse     Breast Cancer Paternal Aunt      Breast Cancer Paternal Grandmother      Breast Cancer Paternal Aunt      Breast Cancer Paternal Aunt        SOCIAL HISTORY:   Social History     Socioeconomic History     Marital status:    Tobacco Use     Smoking status: Every Day     Packs/day: 0.25     Types: Cigarettes     Smokeless tobacco: Never     Tobacco comments:     varies/approx 3 a day   Substance and Sexual Activity     Alcohol use: No     Drug use: No     Sexual activity: Yes     Partners: Male   Other Topics Concern     Caffeine Concern No     Comment: coffee: 1 1/2 cups a day     Sleep Concern Yes     Comment: having a hard time sleeping with health     Stress Concern No     Weight Concern No     Special Diet No     Exercise Yes     Comment: cardiac rehad, walking more     Seat Belt Yes       VITALS:  BP (!) 197/97   Pulse 81   Temp 98.1  F (36.7  C) (Oral)   Resp 16   Wt 64.4 kg (142 lb)   LMP 05/13/2015   SpO2 99%   BMI 23.63 kg/m      PHYSICAL EXAM:  Physical Exam  Vitals and nursing note reviewed.   Constitutional:       Appearance: Normal appearance.   HENT:      Head: Normocephalic and atraumatic.      Right Ear: External ear normal.      Left Ear: External ear normal.      Nose: Nose normal.      Mouth/Throat:      Mouth: Mucous membranes are moist.   Eyes:      Extraocular Movements: Extraocular movements intact.      Conjunctiva/sclera: Conjunctivae normal.      Pupils: Pupils are equal, round, and reactive to light.   Cardiovascular:      Rate and Rhythm: Normal rate and regular rhythm.   Pulmonary:      Effort: Pulmonary effort  is normal.      Breath sounds: Normal breath sounds. No wheezing or rales.   Abdominal:      General: Abdomen is flat. There is no distension.      Palpations: Abdomen is soft.      Tenderness: There is abdominal tenderness in the right upper quadrant and right lower quadrant. There is no guarding.      Comments: Positive Carnett's sign   Musculoskeletal:         General: Normal range of motion.      Cervical back: Normal range of motion and neck supple.      Right lower leg: No edema.      Left lower leg: No edema.   Lymphadenopathy:      Cervical: No cervical adenopathy.   Skin:     General: Skin is warm and dry.   Neurological:      General: No focal deficit present.      Mental Status: She is alert and oriented to person, place, and time. Mental status is at baseline.      Comments: No gross focal neurologic deficits   Psychiatric:         Mood and Affect: Mood normal.         Behavior: Behavior normal.         Thought Content: Thought content normal.          LAB:  All pertinent labs reviewed and interpreted.  Results for orders placed or performed during the hospital encounter of 04/12/23   CT Abdomen Pelvis w Contrast    Impression    IMPRESSION:   1.  No acute findings in the abdomen and pelvis.   UA with Microscopic reflex to Culture    Specimen: Urine, Clean Catch   Result Value Ref Range    Color Urine Colorless Colorless, Straw, Light Yellow, Yellow    Appearance Urine Clear Clear    Glucose Urine Negative Negative mg/dL    Bilirubin Urine Negative Negative    Ketones Urine Negative Negative mg/dL    Specific Gravity Urine 1.005 1.001 - 1.030    Blood Urine 0.03 mg/dL (A) Negative    pH Urine 6.0 5.0 - 7.0    Protein Albumin Urine Negative Negative mg/dL    Urobilinogen Urine <2.0 <2.0 mg/dL    Nitrite Urine Negative Negative    Leukocyte Esterase Urine Negative Negative    Bacteria Urine Few (A) None Seen /HPF    RBC Urine 1 <=2 /HPF    WBC Urine <1 <=5 /HPF    Squamous Epithelials Urine 1 <=1 /HPF    Extra Red Top Tube   Result Value Ref Range    Hold Specimen JIC    Extra Green Top (Lithium Heparin) Tube   Result Value Ref Range    Hold Specimen JIC    Extra Purple Top Tube   Result Value Ref Range    Hold Specimen JIC    Result Value Ref Range    Lipase 26 0 - 52 U/L   CBC with platelets and differential   Result Value Ref Range    WBC Count 7.8 4.0 - 11.0 10e3/uL    RBC Count 4.44 3.80 - 5.20 10e6/uL    Hemoglobin 13.9 11.7 - 15.7 g/dL    Hematocrit 41.5 35.0 - 47.0 %    MCV 94 78 - 100 fL    MCH 31.3 26.5 - 33.0 pg    MCHC 33.5 31.5 - 36.5 g/dL    RDW 12.0 10.0 - 15.0 %    Platelet Count 296 150 - 450 10e3/uL    % Neutrophils 59 %    % Lymphocytes 33 %    % Monocytes 6 %    % Eosinophils 1 %    % Basophils 1 %    % Immature Granulocytes 0 %    NRBCs per 100 WBC 0 <1 /100    Absolute Neutrophils 4.6 1.6 - 8.3 10e3/uL    Absolute Lymphocytes 2.6 0.8 - 5.3 10e3/uL    Absolute Monocytes 0.5 0.0 - 1.3 10e3/uL    Absolute Eosinophils 0.1 0.0 - 0.7 10e3/uL    Absolute Basophils 0.1 0.0 - 0.2 10e3/uL    Absolute Immature Granulocytes 0.0 <=0.4 10e3/uL    Absolute NRBCs 0.0 10e3/uL       RADIOLOGY:  Reviewed all pertinent imaging. Please see official radiology report.  CT Abdomen Pelvis w Contrast   Final Result   IMPRESSION:    1.  No acute findings in the abdomen and pelvis.          I, El Godoy, am serving as a scribe to document services personally performed by Dr. Metzger based on my observation and the provider's statements to me. I, Enrique Metzger MD attest that El Godoy is acting in a scribe capacity, has observed my performance of the services and has documented them in accordance with my direction.    Enrique Metzger M.D.  Emergency Medicine  [facility] EMERGENCY ROOM  [address] [facility] [id number] [phone]  Dept: [phone]      Enrique Metzger MD  04/12/23 7954

## 2023-04-12 NOTE — ED TRIAGE NOTES
Pt states she has had 6weeks of upper abd pain but gets lower when pressed on.  She went to PMD and sent here for further eval.  She also has increase in pain when flexing and moving.  Yesterday she trimmed trees and this made pain worse.  Sent from PMD for appy vs hernia issues.  Denies NVCD.     Triage Assessment     Row Name 04/12/23 0244       Triage Assessment (Adult)    Airway WDL WDL       Respiratory WDL    Respiratory WDL WDL       Skin Circulation/Temperature WDL    Skin Circulation/Temperature WDL WDL       Cardiac WDL    Cardiac WDL WDL       Peripheral/Neurovascular WDL    Peripheral Neurovascular WDL WDL       Cognitive/Neuro/Behavioral WDL    Cognitive/Neuro/Behavioral WDL WDL

## 2023-04-12 NOTE — ED NOTES
Expected Patient Referral to ED  2:52 PM    Referring Clinic/Provider:  Judy Burnette    Reason for referral/Clinical facts:  51 y/o female presented to clinic for evaluation of intermittent RLQ pain that has been ongoing for the last 6 weeks.  Patient has a history of a hysterectomy and left oophorectomy.  Vital signs are stable in clinic.  No labs or imaging performed.  Patient being sent to the ED to evaluate for possible appendicitis versus ovarian torsion.    Recommendations provided:  Send to ED for further evaluation    Caller was informed that this institution does possess the capabilities and/or resources to provide for patient and should be transferred to our facility.    Discussed that if direct admit is sought and any hurdles are encountered, this ED would be happy to see the patient and evaluate.    Informed caller that recommendations provided are recommendations based only on the facts provided and that they responsible to accept or reject the advice, or to seek a formal in person consultation as needed and that this ED will see/treat patient should they arrive.      Arlet Mcclelland DO  [facility] EMERGENCY ROOM  [address] [place] [id number] [phone]       Arlet Mcclelland DO  04/12/23 6940

## 2024-01-15 ENCOUNTER — LAB REQUISITION (OUTPATIENT)
Dept: LAB | Facility: CLINIC | Age: 54
End: 2024-01-15

## 2024-01-15 DIAGNOSIS — Z13.1 ENCOUNTER FOR SCREENING FOR DIABETES MELLITUS: ICD-10-CM

## 2024-01-15 DIAGNOSIS — E03.9 HYPOTHYROIDISM, UNSPECIFIED: ICD-10-CM

## 2024-01-15 DIAGNOSIS — E55.9 VITAMIN D DEFICIENCY, UNSPECIFIED: ICD-10-CM

## 2024-01-15 DIAGNOSIS — E78.2 MIXED HYPERLIPIDEMIA: ICD-10-CM

## 2024-01-15 LAB
ANION GAP SERPL CALCULATED.3IONS-SCNC: 12 MMOL/L (ref 7–15)
BUN SERPL-MCNC: 7 MG/DL (ref 6–20)
CALCIUM SERPL-MCNC: 9.5 MG/DL (ref 8.6–10)
CHLORIDE SERPL-SCNC: 102 MMOL/L (ref 98–107)
CHOLEST SERPL-MCNC: 310 MG/DL
CREAT SERPL-MCNC: 0.95 MG/DL (ref 0.51–0.95)
DEPRECATED HCO3 PLAS-SCNC: 25 MMOL/L (ref 22–29)
EGFRCR SERPLBLD CKD-EPI 2021: 71 ML/MIN/1.73M2
FASTING STATUS PATIENT QL REPORTED: ABNORMAL
GLUCOSE SERPL-MCNC: 101 MG/DL (ref 70–99)
HDLC SERPL-MCNC: 48 MG/DL
LDLC SERPL CALC-MCNC: 229 MG/DL
NONHDLC SERPL-MCNC: 262 MG/DL
POTASSIUM SERPL-SCNC: 4.1 MMOL/L (ref 3.4–5.3)
SODIUM SERPL-SCNC: 139 MMOL/L (ref 135–145)
TRIGL SERPL-MCNC: 165 MG/DL
TSH SERPL DL<=0.005 MIU/L-ACNC: 33.2 UIU/ML (ref 0.3–4.2)

## 2024-01-15 PROCEDURE — 80048 BASIC METABOLIC PNL TOTAL CA: CPT | Performed by: STUDENT IN AN ORGANIZED HEALTH CARE EDUCATION/TRAINING PROGRAM

## 2024-01-15 PROCEDURE — 82306 VITAMIN D 25 HYDROXY: CPT | Performed by: STUDENT IN AN ORGANIZED HEALTH CARE EDUCATION/TRAINING PROGRAM

## 2024-01-15 PROCEDURE — 80061 LIPID PANEL: CPT | Performed by: STUDENT IN AN ORGANIZED HEALTH CARE EDUCATION/TRAINING PROGRAM

## 2024-01-15 PROCEDURE — 84443 ASSAY THYROID STIM HORMONE: CPT | Performed by: STUDENT IN AN ORGANIZED HEALTH CARE EDUCATION/TRAINING PROGRAM

## 2024-01-17 LAB — VIT D+METAB SERPL-MCNC: 36 NG/ML (ref 20–50)

## 2024-04-15 ENCOUNTER — LAB REQUISITION (OUTPATIENT)
Dept: LAB | Facility: CLINIC | Age: 54
End: 2024-04-15

## 2024-04-15 DIAGNOSIS — E03.9 HYPOTHYROIDISM, UNSPECIFIED: ICD-10-CM

## 2024-04-15 DIAGNOSIS — E78.2 MIXED HYPERLIPIDEMIA: ICD-10-CM

## 2024-04-15 PROCEDURE — 84443 ASSAY THYROID STIM HORMONE: CPT | Performed by: FAMILY MEDICINE

## 2024-04-15 PROCEDURE — 80061 LIPID PANEL: CPT | Performed by: FAMILY MEDICINE

## 2024-04-15 PROCEDURE — 80053 COMPREHEN METABOLIC PANEL: CPT | Performed by: FAMILY MEDICINE

## 2024-04-17 LAB
ALBUMIN SERPL BCG-MCNC: 4.6 G/DL (ref 3.5–5.2)
ALP SERPL-CCNC: 67 U/L (ref 40–150)
ALT SERPL W P-5'-P-CCNC: 25 U/L (ref 0–50)
ANION GAP SERPL CALCULATED.3IONS-SCNC: 12 MMOL/L (ref 7–15)
AST SERPL W P-5'-P-CCNC: 25 U/L (ref 0–45)
BILIRUB SERPL-MCNC: 0.5 MG/DL
BUN SERPL-MCNC: 9.6 MG/DL (ref 6–20)
CALCIUM SERPL-MCNC: 9.7 MG/DL (ref 8.6–10)
CHLORIDE SERPL-SCNC: 107 MMOL/L (ref 98–107)
CHOLEST SERPL-MCNC: 166 MG/DL
CREAT SERPL-MCNC: 0.83 MG/DL (ref 0.51–0.95)
DEPRECATED HCO3 PLAS-SCNC: 25 MMOL/L (ref 22–29)
EGFRCR SERPLBLD CKD-EPI 2021: 84 ML/MIN/1.73M2
FASTING STATUS PATIENT QL REPORTED: NORMAL
GLUCOSE SERPL-MCNC: 113 MG/DL (ref 70–99)
HDLC SERPL-MCNC: 54 MG/DL
LDLC SERPL CALC-MCNC: 93 MG/DL
NONHDLC SERPL-MCNC: 112 MG/DL
POTASSIUM SERPL-SCNC: 4.2 MMOL/L (ref 3.4–5.3)
PROT SERPL-MCNC: 7.1 G/DL (ref 6.4–8.3)
SODIUM SERPL-SCNC: 144 MMOL/L (ref 135–145)
TRIGL SERPL-MCNC: 97 MG/DL
TSH SERPL DL<=0.005 MIU/L-ACNC: 5.4 UIU/ML (ref 0.3–4.2)